# Patient Record
Sex: FEMALE | Race: WHITE
[De-identification: names, ages, dates, MRNs, and addresses within clinical notes are randomized per-mention and may not be internally consistent; named-entity substitution may affect disease eponyms.]

---

## 2017-12-19 ENCOUNTER — HOSPITAL ENCOUNTER (EMERGENCY)
Dept: HOSPITAL 56 - MW.ED | Age: 42
LOS: 1 days | Discharge: HOME | End: 2017-12-20
Payer: COMMERCIAL

## 2017-12-19 DIAGNOSIS — R07.89: ICD-10-CM

## 2017-12-19 DIAGNOSIS — F41.9: Primary | ICD-10-CM

## 2017-12-19 DIAGNOSIS — Z98.84: ICD-10-CM

## 2017-12-19 DIAGNOSIS — Z91.14: ICD-10-CM

## 2017-12-19 DIAGNOSIS — I10: ICD-10-CM

## 2017-12-19 DIAGNOSIS — Z88.5: ICD-10-CM

## 2017-12-19 LAB
CHLORIDE SERPL-SCNC: 105 MMOL/L (ref 98–110)
SODIUM SERPL-SCNC: 136 MMOL/L (ref 136–146)

## 2017-12-19 PROCEDURE — 96361 HYDRATE IV INFUSION ADD-ON: CPT

## 2017-12-19 PROCEDURE — 96374 THER/PROPH/DIAG INJ IV PUSH: CPT

## 2017-12-19 PROCEDURE — 93005 ELECTROCARDIOGRAM TRACING: CPT

## 2017-12-19 PROCEDURE — 84484 ASSAY OF TROPONIN QUANT: CPT

## 2017-12-19 PROCEDURE — 99285 EMERGENCY DEPT VISIT HI MDM: CPT

## 2017-12-19 PROCEDURE — 96375 TX/PRO/DX INJ NEW DRUG ADDON: CPT

## 2017-12-19 PROCEDURE — 71010: CPT

## 2017-12-19 PROCEDURE — 80053 COMPREHEN METABOLIC PANEL: CPT

## 2017-12-19 PROCEDURE — 87086 URINE CULTURE/COLONY COUNT: CPT

## 2017-12-19 PROCEDURE — 84443 ASSAY THYROID STIM HORMONE: CPT

## 2017-12-19 PROCEDURE — 85025 COMPLETE CBC W/AUTO DIFF WBC: CPT

## 2017-12-19 RX ADMIN — METOROPROLOL TARTRATE SCH MG: 5 INJECTION, SOLUTION INTRAVENOUS at 23:02

## 2017-12-19 RX ADMIN — METOROPROLOL TARTRATE SCH MG: 5 INJECTION, SOLUTION INTRAVENOUS at 23:18

## 2017-12-19 RX ADMIN — METOROPROLOL TARTRATE SCH MG: 5 INJECTION, SOLUTION INTRAVENOUS at 22:54

## 2017-12-19 RX ADMIN — METOROPROLOL TARTRATE SCH: 5 INJECTION, SOLUTION INTRAVENOUS at 23:21

## 2017-12-19 RX ADMIN — METOROPROLOL TARTRATE SCH: 5 INJECTION, SOLUTION INTRAVENOUS at 23:22

## 2017-12-19 NOTE — EDM.PDOC
<Brant Caballero - Last Filed: 12/19/17 23:57>





ED HPI GENERAL MEDICAL PROBLEM





- General


Stated Complaint: CHEST PAIN


Time Seen by Provider: 12/19/17 21:25





- History of Present Illness


INITIAL COMMENTS - FREE TEXT/NARRATIVE: 





Patient is seen and examined agree with above





HEENT grossly within normal limits


Chest clear throughout


CV regular rate and rhythm


Abdomen soft nontender benign exam


Extremities full range of motion strength 5 out of 5 no edema


CNS alert nonfocal





Lab as below


EKG


Chest 1 view





Assessment


Anxiety


Chest pain resolved


Hypertension-improved


Medication noncompliance


Headache resolved


Vasotec 1.25 mg IV


Plan





Lopressor 5 mg IV 3


Toradol 30 mg IV





Patient discharged with hydrochlorothiazide 25 mg by mouth daily #30 no refill


Ativan 0.5 mg by mouth twice a day #10 no refill





Definitive disposition and diagnosis as appropriate pending reevaluation and 

review of above





- Related Data


 Allergies











Allergy/AdvReac Type Severity Reaction Status Date / Time


 


morphine Allergy  Anaphylactic Verified 12/19/17 21:26





   Shock  











Home Meds: 


 Home Meds





. [No Known Home Meds]  12/19/17 [History]











Course





- Vital Signs


Last Recorded V/S: 


 Last Vital Signs











Temp  98.2 F   12/20/17 00:14


 


Pulse  74   12/20/17 00:14


 


Resp  18   12/20/17 00:14


 


BP  158/86 H  12/20/17 00:14


 


Pulse Ox  97   12/20/17 00:14














- Orders/Labs/Meds


Orders: 


 Active Orders 24 hr











 Category Date Time Status


 


 EKG Documentation Completion [RC] STAT Care  12/19/17 21:17 Active


 


 CULTURE URINE [RM] Stat Lab  12/19/17 22:51 Received











Labs: 


 Laboratory Tests











  12/19/17 12/19/17 12/19/17 Range/Units





  21:27 21:27 21:27 


 


WBC  11.19 H    (4.0-11.0)  K/uL


 


RBC  4.63    (4.30-5.90)  M/uL


 


Hgb  8.1 L    (12.0-16.0)  g/dL


 


Hct  27.8 L    (36.0-46.0)  %


 


MCV  60.0 L    (80.0-98.0)  fL


 


MCH  17.5 L    (27.0-32.0)  pg


 


MCHC  29.1 L    (31.0-37.0)  g/dL


 


RDW Std Deviation  40.3    (28.0-62.0)  fl


 


RDW Coeff of Gonsalo  19 H    (11.0-15.0)  %


 


Plt Count  216    (150-400)  K/uL


 


Add Manual Diff  YES    


 


Neutrophils % (Manual)  60    (48.0-80.0)  %


 


Band Neutrophils %  3    %


 


Lymphocytes % (Manual)  35    (16.0-40.0)  %


 


Monocytes % (Manual)  2    (0.0-15.0)  %


 


Nucleated RBC %  0.0    /100WBC


 


Absolute Seg Neuts  6.7 H    (1.4-5.7)  


 


Band Neutrophils #  0.3    


 


Lymphocytes # (Manual)  3.9 H    (0.6-2.4)  


 


Monocytes # (Manual)  0.2    (0.0-0.8)  


 


Nucleated RBCs #  0    K/uL


 


Sodium   136   (136-146)  mmol/L


 


Potassium   3.9   (3.5-5.1)  mmol/L


 


Chloride   105   ()  mmol/L


 


Carbon Dioxide   22   (21-31)  mmol/L


 


BUN   9   (6.0-23.0)  mg/dL


 


Creatinine   0.7   (0.6-1.5)  mg/dL


 


Est Cr Clr Drug Dosing   90.41   mL/min


 


Estimated GFR (MDRD)   > 60.0   ml/min


 


Glucose   87   ()  mg/dL


 


Calcium   9.3   (8.8-10.8)  mg/dL


 


Total Bilirubin   0.2   (0.1-1.5)  mg/dL


 


AST   17   (5-40)  IU/L


 


ALT   9   (8-54)  IU/L


 


Alkaline Phosphatase   135   ()  


 


Troponin I   < 0.10   (0.0-0.29)  NG/ML


 


Total Protein   8.0   (6.0-8.0)  g/dL


 


Albumin   3.9   (3.5-5.0)  g/dL


 


Globulin   4.1 H   (2.0-3.5)  g/dL


 


Albumin/Globulin Ratio   1.0 L   (1.3-2.8)  


 


TSH 3rd Generation    1.13  (0.47-5.0)  uIU/mL











Meds: 


Medications














Discontinued Medications














Generic Name Dose Route Start Last Admin





  Trade Name Freq  PRN Reason Stop Dose Admin


 


Aspirin  324 mg  12/19/17 21:17  12/19/17 21:41





  Aspirin  PO  12/19/17 21:18  324 mg





  ONETIME ONE   Administration


 


Enalaprilat  1.25 mg  12/19/17 23:15  12/19/17 23:32





  Vasotec Iv  IVPUSH  12/19/17 23:16  1.25 mg





  ONETIME ONE   Administration


 


Sodium Chloride  1,000 mls @ 999 mls/hr  12/19/17 21:30  12/19/17 21:48





  Normal Saline  IV  12/19/17 22:30  999 mls/hr





  STAT ONE   Administration


 


Ketorolac Tromethamine  30 mg  12/19/17 23:37  12/19/17 23:51





  Toradol  IVPUSH  12/19/17 23:38  30 mg





  ONETIME ONE   Administration


 


Lorazepam  1 mg  12/19/17 21:34  12/19/17 21:42





  Ativan  IVPUSH  12/19/17 21:35  1 mg





  ONETIME ONE   Administration


 


Metoprolol Tartrate  5 mg  12/19/17 22:15  12/19/17 23:18





  Lopressor  IVPUSH  12/19/17 22:26  5 mg





  Q5M THOR   Administration


 


Metoprolol Tartrate  5 mg  12/19/17 23:00  12/19/17 23:22





  Lopressor  IVPUSH  12/19/17 23:11  Not Given





  Q5M THOR   


 


Nitroglycerin  0.4 mg  12/19/17 21:30  





  Nitrostat  SL   





  Q5M PRN   





  Chest Pain   














Departure





- Departure


Time of Disposition: 00:01


Disposition: Home, Self-Care 01


Condition: Good


Clinical Impression: 


 Anxiety, Hypertension





Instructions:  Panic Attacks, Easy-to-Read, Hypertension


Referrals: 


PCP,None [Primary Care Provider] - 


Forms:  ED Department Discharge


Additional Instructions: 


The following information is given to patients seen in the emergency department 

who are being discharged to home. This information is to outline your options 

for follow-up care. We provide all patients seen in our emergency department 

with a follow-up referral.





The need for follow-up, as well as the timing and circumstances, are variable 

depending upon the specifics of your emergency department visit.





If you don't have a primary care physician on staff, we will provide you with a 

referral. We always advise you to contact your personal physician following an 

emergency department visit to inform them of the circumstance of the visit and 

for follow-up with them and/or the need for any referrals to a consulting 

specialist.





The emergency department will also refer you to a specialist when appropriate. 

This referral assures that you have the opportunity for follow-up care with a 

specialist. All of these measure are taken in an effort to provide you with 

optimal care, which includes your follow-up.





Under all circumstances we always encourage you to contact your private 

physician who remains a resource for coordinating your care. When calling for 

follow-up care, please make the office aware that this follow-up is from your 

recent emergency room visit. If for any reason you are refused follow-up, 

please contact the [facility] emergency department at [phone] 

and asked to speak to the emergency department charge nurse.








- My Orders


Last 24 Hours: 


My Active Orders





12/19/17 21:17


EKG Documentation Completion [RC] STAT 














- Assessment/Plan


Last 24 Hours: 


My Active Orders





12/19/17 21:17


EKG Documentation Completion [RC] STAT 














<Lucas Au E - Last Filed: 12/20/17 10:29>





ED HPI GENERAL MEDICAL PROBLEM





- General


Source of Information: Reports: Patient


History Limitations: Reports: No Limitations





- History of Present Illness


INITIAL COMMENTS - FREE TEXT/NARRATIVE: 





HISTORY AND PHYSICAL:





History of present illness:


Patient is a 42-year-old female who presents to the emergency room today with 

complaints of chest pain. She states that last night she started to have some 

neck pain that radiated to her head which created a "little anxiety". That 

night she experienced mild chest pain that would last only a few seconds. She 

states that she had not really given much thought today until about an hour 

prior to arrival she started experiencing midsternal chest pain, shortness of 

breath, nausea and anxiety. She is concerned she is having a "heart attack". 

Current pain is a 8/10 chest she describes as "something sitting on my chest".





Has a past medical history of hypertension. She states she does not like to 

take prescribed medication because "the FDA is trying to kill all of us". She 

takes a over-the-counter supplement called natural remedies which she feels 

helps her blood pressure. Also had a gastric bypass in 2009.





Family history of diabetes, cancer and heart disease. She states that both of 

her parents have passed away from heart attacks.


She has no smoking/tobacco use history.





Review of systems: 


As per history of present illness and below otherwise all systems reviewed and 

negative.





Past medical history: 


As per history of present illness and as reviewed below otherwise 

noncontributory.





Surgical history: 


As per history of present illness and as reviewed below otherwise 

noncontributory.





Social history: 


No reported history of drug or alcohol abuse.





Family history: 


As per history of present illness and as reviewed below otherwise 

noncontributory.





Physical exam:


Gen.: Well-developed and well-nourished 42-year-old female. Alert and oriented. 

Appears anxious but in no acute distress. Nontoxic appearing.


HEENT: Atraumatic, normocephalic, pupils reactive, negative for conjunctival 

pallor or scleral icterus, mucous membranes moist, throat clear, neck supple, 

nontender, trachea midline.


Lungs: Clear to auscultation, breath sounds equal bilaterally, chest slightly 

tender to the left chest wall and pain is reproducible.


Heart: S1S2, regular rate and rhythm without overt murmurs


Abdomen: Soft, nondistended, nontender. Negative for masses or 

hepatosplenomegaly. Negative for costovertebral tenderness.


Pelvis: Stable nontender.


Genitourinary: Deferred.


Rectal: Deferred.


Extremities: Atraumatic, negative for cords or calf pain. Neurovascular 

unremarkable.


Neuro: Awake, alert, oriented. Cranial nerves II through XII unremarkable. 

Cerebellum unremarkable. Motor and sensory unremarkable throughout. Exam 

nonfocal.





Patient was signed out to Dr. Martinez at 2200.





Diagnostics:


CBC, CMP, troponin, EKG, one view chest, TSH





Therapeutics:


Cardiac monitor, aspirin, nitroglycerin, fluids





Impression: 


Chest pain


Anxiety





Plan:


Dr. Kiran discharged this patient to home. Please see his note


Definitive disposition and diagnosis as appropriate pending reevaluation and 

review of above.





Onset: Today


Duration: Hour(s):


Location: Reports: Chest


Quality: Reports: Pressure


Severity: Severe


Improves with: Reports: None


Worsens with: Reports: None


Associated Symptoms: Reports: Chest Pain, Headaches (Has resolved since last 

night), Nausea/Vomiting, Shortness of Breath.  Denies: Confusion, Cough, cough 

w sputum, Diaphoresis, Fever/Chills, Loss of Appetite, Malaise, Rash, Seizure, 

Syncope, Weakness


  ** chest pain


Pain Score (Numeric/FACES): 6





ED ROS GENERAL





- Review of Systems


Review Of Systems: ROS reveals no pertinent complaints other than HPI.





ED EXAM, GENERAL





- Physical Exam


Exam: See Below (See dictation)





Course





- Vital Signs


Last Recorded V/S: 


 Last Vital Signs











Temp  98.2 F   12/20/17 00:14


 


Pulse  74   12/20/17 00:14


 


Resp  18   12/20/17 00:14


 


BP  158/86 H  12/20/17 00:14


 


Pulse Ox  97   12/20/17 00:14














- Orders/Labs/Meds


Labs: 


 Laboratory Tests











  12/19/17 12/19/17 12/19/17 Range/Units





  21:27 21:27 21:27 


 


WBC  11.19 H    (4.0-11.0)  K/uL


 


RBC  4.63    (4.30-5.90)  M/uL


 


Hgb  8.1 L    (12.0-16.0)  g/dL


 


Hct  27.8 L    (36.0-46.0)  %


 


MCV  60.0 L    (80.0-98.0)  fL


 


MCH  17.5 L    (27.0-32.0)  pg


 


MCHC  29.1 L    (31.0-37.0)  g/dL


 


RDW Std Deviation  40.3    (28.0-62.0)  fl


 


RDW Coeff of Gonsalo  19 H    (11.0-15.0)  %


 


Plt Count  216    (150-400)  K/uL


 


Add Manual Diff  YES    


 


Neutrophils % (Manual)  60    (48.0-80.0)  %


 


Band Neutrophils %  3    %


 


Lymphocytes % (Manual)  35    (16.0-40.0)  %


 


Monocytes % (Manual)  2    (0.0-15.0)  %


 


Nucleated RBC %  0.0    /100WBC


 


Absolute Seg Neuts  6.7 H    (1.4-5.7)  


 


Band Neutrophils #  0.3    


 


Lymphocytes # (Manual)  3.9 H    (0.6-2.4)  


 


Monocytes # (Manual)  0.2    (0.0-0.8)  


 


Nucleated RBCs #  0    K/uL


 


Sodium   136   (136-146)  mmol/L


 


Potassium   3.9   (3.5-5.1)  mmol/L


 


Chloride   105   ()  mmol/L


 


Carbon Dioxide   22   (21-31)  mmol/L


 


BUN   9   (6.0-23.0)  mg/dL


 


Creatinine   0.7   (0.6-1.5)  mg/dL


 


Est Cr Clr Drug Dosing   90.41   mL/min


 


Estimated GFR (MDRD)   > 60.0   ml/min


 


Glucose   87   ()  mg/dL


 


Calcium   9.3   (8.8-10.8)  mg/dL


 


Total Bilirubin   0.2   (0.1-1.5)  mg/dL


 


AST   17   (5-40)  IU/L


 


ALT   9   (8-54)  IU/L


 


Alkaline Phosphatase   135   ()  


 


Troponin I   < 0.10   (0.0-0.29)  NG/ML


 


Total Protein   8.0   (6.0-8.0)  g/dL


 


Albumin   3.9   (3.5-5.0)  g/dL


 


Globulin   4.1 H   (2.0-3.5)  g/dL


 


Albumin/Globulin Ratio   1.0 L   (1.3-2.8)  


 


TSH 3rd Generation    1.13  (0.47-5.0)  uIU/mL

## 2017-12-20 NOTE — CR
EXAM DATE: 17



PATIENT'S AGE: 42





Patient: GROVER GARCIA



Facility: [place], [place]

Patient ID: [id number]

Site Patient ID: [id number].

Site Accession #: [id number]

: 1975

Study: XRay Chest [id number]/19/2017 10:19:59 PM

Ordering Physician: Qiana Guo



Final Report: 

Indication:

Chest pain



Technique:

Chest 1 view



Comparison:

None



Findings/Impression:

Cardiovascular and mediastinum: Unremarkable cardiac silhouette for a portable 
technique. An unfolded aorta. 

Lungs and pleural space: A lordotic study. Elevated right hemidiaphragm. No 
consolidation or pleural effusions. No pneumothorax seen. 

Bones and soft tissues: No significant findings.



Dictated by Beny Shah MD @ 2017 11:14:35 PM





Dictated by: Beny Shah MD @ 2017 23:14:45

(Electronic Signature)



Report Signed by Proxy.
[facility]

## 2018-02-16 ENCOUNTER — HOSPITAL ENCOUNTER (EMERGENCY)
Dept: HOSPITAL 56 - MW.ED | Age: 43
Discharge: HOME | End: 2018-02-16
Payer: COMMERCIAL

## 2018-02-16 DIAGNOSIS — Z91.040: ICD-10-CM

## 2018-02-16 DIAGNOSIS — G44.209: Primary | ICD-10-CM

## 2018-02-16 DIAGNOSIS — Z91.048: ICD-10-CM

## 2018-02-16 DIAGNOSIS — Z88.8: ICD-10-CM

## 2018-02-16 DIAGNOSIS — I10: ICD-10-CM

## 2018-02-16 DIAGNOSIS — Z88.5: ICD-10-CM

## 2018-02-16 PROCEDURE — 96372 THER/PROPH/DIAG INJ SC/IM: CPT

## 2018-02-16 PROCEDURE — 99284 EMERGENCY DEPT VISIT MOD MDM: CPT

## 2018-02-16 NOTE — EDM.PDOC
ED HPI GENERAL MEDICAL PROBLEM





- General


Chief Complaint: Headache


Stated Complaint: BLOOD PRESSURE MEDS/HEADACHE


Time Seen by Provider: 02/16/18 19:10


Source of Information: Reports: Patient


History Limitations: Reports: No Limitations





- History of Present Illness


INITIAL COMMENTS - FREE TEXT/NARRATIVE: 


HISTORY AND PHYSICAL:





History of present illness:


Patient is a 42-year-old female who presents to the emergency room today with 

complaints of an intermittent headache  2 weeks. She says in the last 2 weeks 

she has stopped her caffeine use and has been out of her antihypertensive 

medication. Previously she was taking losartan 100 mg daily, but recently moved 

here and has not been able to find a primary care provider. She reports that 

she has  insurance and had some difficulty finding someone "out of 

network".


Describes the headache as a tension headache with pressure around her scalp and 

going into her trapezius muscles, light sensitivity and noise sensitivity. She 

denies any chest pain, shortness of breath, abdominal pain, nausea, vomiting or 

diarrhea. She denies any recent head injury or trauma. No change in vision.





Patient reports that she was seen in December 2017 and prescribed "something 

for my blood pressure" but was on sure of what was called. She did not take 

this medication for very long "it was so weak and didn't do anything for my 

blood pressure" as she was able to fill her Losartan that time. She has not 

followed-up since that visit.





Review of systems: 


As per history of present illness and below otherwise all systems reviewed and 

negative.





Past medical history: 


As per history of present illness and as reviewed below otherwise 

noncontributory.





Surgical history: 


As per history of present illness and as reviewed below otherwise 

noncontributory.





Social history: 


No reported history of drug or alcohol abuse.





Family history: 


As per history of present illness and as reviewed below otherwise 

noncontributory.





Physical exam:


General: Well-developed and well-nourished 42-year-old female. Alert and 

oriented. Nontoxic appearing and in no acute distress.


HEENT: Atraumatic, normocephalic, pupils reactive, negative for conjunctival 

pallor or scleral icterus, mucous membranes moist, throat clear, neck supple, 

nontender, trachea midline. No drooling or trismus. No meningeal signs.


Lungs: Clear to auscultation, breath sounds equal bilaterally, chest nontender.


Heart: S1S2, regular rate and rhythm


Abdomen: Soft, nondistended, nontender. Negative for masses or 

hepatosplenomegaly. Negative for costovertebral tenderness.


Pelvis: Stable nontender.


Genitourinary: Deferred.


Rectal: Deferred.


Extremities: Atraumatic, negative for cords or calf pain. Neurovascular 

unremarkable.


Neuro: Awake, alert, oriented. Cranial nerves II through XII unremarkable. 

Cerebellum unremarkable. Motor and sensory unremarkable throughout. Exam 

nonfocal.





After physical examination I did offer the patient routine lab work along with 

IV medications to help alleviate her migraine headache. She declines any lab 

work at this time. She would like to try to manage her blood pressure prior to 

any IV medications. She states that "once I get my blood pressure lowered I 

think the headache will go away". I am willing to try Clonidine by mouth 

initially, if this does not lower her blood pressure and alleviate her headache 

at that time she is willing to do IV medications.





Patient's blood pressure has improved. Patient continues to have a headache. As 

the patient is describing a tension type headache for the past 2 weeks I will 

give her one tablet of Flexeril. She currently does not have a ride, will 

dispense to home. Thorough education was given to her. She'll take this tab 

prior to bedtime. She declined the lab work that we discussed previously. 

Declines any IV medications. She is requesting to go home. For further 

management she will take her prescribed losartan and over-the-counter pain 

medication to manage her headache. Information and referral to primary care was 

given to her. She voices understanding of following up for further education 

refills. She denies any further questions at this time.





Diagnostics:


[]





Therapeutics:


Clonidine 0.1 mg PO





Impression: 


#1 request for medication refill


#2 hypertension


#3 tension headache





Plan:


1. Please take the tablet of Flexeril prior to bedtime. This medication will 

cause drowsiness or do not take it when driving or needing to be functioning 

outside of the house.


2. A limited amount of your low certain has been prescribed for you. For 

further medication refills you do need to find primary care. Phone numbers have 

been given to you. If he told them he had been seen in the emergency room he 

can get an expedited appointment. Please call Monday to arrange that.


3. Return to the ED as needed and as discussed.





Definitive disposition and diagnosis as appropriate pending reevaluation and 

review of above.





Duration: Week(s):


Location: Reports: Head


  ** headache


Pain Score (Numeric/FACES): 7





- Related Data


 Allergies











Allergy/AdvReac Type Severity Reaction Status Date / Time


 


adhesive Allergy  Hives Verified 02/16/18 19:24


 


latex Allergy  Hives Verified 02/16/18 19:24


 


morphine Allergy  Anaphylactic Verified 02/16/18 19:23





   Shock  


 


povidone-iodine Allergy  Hives Verified 02/16/18 19:24





[From Betadine]     


 


soap [From Betadine] Allergy  Hives Verified 02/16/18 19:24











Home Meds: 


 Home Meds





Antihypertensive Medication  02/16/18 [History]











Past Medical History


HEENT History: Reports: None


Cardiovascular History: Reports: Hypertension


Respiratory History: Reports: None


Gastrointestinal History: Reports: None


Genitourinary History: Reports: None


Musculoskeletal History: Reports: None


Neurological History: Reports: None


Psychiatric History: Reports: Anxiety


Endocrine/Metabolic History: Reports: None


Hematologic History: Reports: None


Immunologic History: Reports: None


Oncologic (Cancer) History: Reports: None


Dermatologic History: Reports: None





- Infectious Disease History


Infectious Disease History: Reports: None





- Past Surgical History


Head Surgeries/Procedures: Reports: None


GI Surgical History: Reports: Bariatric Procedure


Female  Surgical History: Reports: None





Social & Family History





- Family History


Family Medical History: Noncontributory


Cardiac: Reports: Other (See Below)


Other Cardiac Family History: Heart Disease


Endocrine/Metabolic: Reports: Diabetes, type II


Oncologic: Reports: Other (See Below)


Other Oncologic Family History: Cancer





- Tobacco Use


Smoking Status *Q: Never Smoker





- Caffeine Use


Caffeine Use: Reports: None





- Recreational Drug Use


Recreational Drug Use: No





ED ROS GENERAL





- Review of Systems


Review Of Systems: ROS reveals no pertinent complaints other than HPI.





- Physical Exam


Exam: See Below (See dictation)





Course





- Vital Signs


Last Recorded V/S: 


 Last Vital Signs











Temp  98.1 F   02/16/18 20:22


 


Pulse  77   02/16/18 20:22


 


Resp  17   02/16/18 20:22


 


BP  142/81 H  02/16/18 20:22


 


Pulse Ox  99   02/16/18 20:22














- Orders/Labs/Meds


Orders: 


 Active Orders 24 hr











 Category Date Time Status


 


 Cyclobenzaprine [Flexeril] Med  02/16/18 20:24 Once





 10 mg PO ONETIME ONE   











Meds: 


Medications














Discontinued Medications














Generic Name Dose Route Start Last Admin





  Trade Name Carlos  PRN Reason Stop Dose Admin


 


Clonidine HCl  0.1 mg  02/16/18 19:35  02/16/18 19:41





  Catapres  PO  02/16/18 19:36  0.1 mg





  ONETIME ONE   Administration


 


Ketorolac Tromethamine  60 mg  02/16/18 19:42  02/16/18 19:49





  Toradol  IM  02/16/18 19:43  60 mg





  ONETIME ONE   Administration














Departure





- Departure


Time of Disposition: 20:27


Disposition: Home, Self-Care 01


Clinical Impression: 


 Encounter for medication refill, Tension-type headache





Hypertension


Qualifiers:


 Hypertension type: essential hypertension Qualified Code(s): I10 - Essential (

primary) hypertension








- Discharge Information


Referrals: 


PCP,None [Primary Care Provider] - 


Forms:  ED Department Discharge


Additional Instructions: 


My general discharge


The following information is given to patients seen in the emergency department 

who are being discharged to home. This information is to outline your options 

for follow-up care. We provide all patients seen in our emergency department 

with a follow-up referral.





The need for follow-up, as well as the timing and circumstances, are variable 

depending upon the specifics of your emergency department visit.





If you don't have a primary care physician on staff, we will provide you with a 

referral. We always advise you to contact your personal physician following an 

emergency department visit to inform them of the circumstance of the visit and 

for follow-up with them and/or the need for any referrals to a consulting 

specialist.





The emergency department will also refer you to a specialist when appropriate. 

This referral assures that you have the opportunity for follow-up care with a 

specialist. All of these measure are taken in an effort to provide you with 

optimal care, which includes your follow-up.





Under all circumstances we always encourage you to contact your private 

physician who remains a resource for coordinating your care. When calling for 

follow-up care, please make the office aware that this follow-up is from your 

recent emergency room visit. If for any reason you are refused follow-up, 

please contact the [facility] Emergency 

Department at [phone] and asked to speak to the emergency department 

charge nurse.





[facility]


Primary Care


[address] [place] [id number]


Phone: [phone]


Fax: [phone]





1. Please take the tablet of Flexeril prior to bedtime. This medication (muscle 

relaxor for the tension headache) will cause drowsiness or do not take it when 

driving or needing to be functioning outside of the house.


2. A limited amount of your Losartan (blood pressure medication) has been 

prescribed for you. For further medication refills you do need to find primary 

care. Phone numbers have been given to you. If you inform the appointment desk 

that you been seen in the emergency room, they typically can get you an 

expedited appointment. Please call Monday to arrange that.


3. Return to the ED as needed and as discussed.





- My Orders


Last 24 Hours: 


My Active Orders





02/16/18 20:24


Cyclobenzaprine [Flexeril]   10 mg PO ONETIME ONE 














- Assessment/Plan


Last 24 Hours: 


My Active Orders





02/16/18 20:24


Cyclobenzaprine [Flexeril]   10 mg PO ONETIME ONE

## 2018-04-20 ENCOUNTER — HOSPITAL ENCOUNTER (INPATIENT)
Dept: HOSPITAL 56 - MW.ED | Age: 43
LOS: 1 days | Discharge: HOME | DRG: 812 | End: 2018-04-21
Attending: INTERNAL MEDICINE | Admitting: INTERNAL MEDICINE
Payer: COMMERCIAL

## 2018-04-20 DIAGNOSIS — R10.11: ICD-10-CM

## 2018-04-20 DIAGNOSIS — K29.70: ICD-10-CM

## 2018-04-20 DIAGNOSIS — Z98.84: ICD-10-CM

## 2018-04-20 DIAGNOSIS — Z91.040: ICD-10-CM

## 2018-04-20 DIAGNOSIS — N13.30: ICD-10-CM

## 2018-04-20 DIAGNOSIS — Z87.442: ICD-10-CM

## 2018-04-20 DIAGNOSIS — R19.7: ICD-10-CM

## 2018-04-20 DIAGNOSIS — F41.9: ICD-10-CM

## 2018-04-20 DIAGNOSIS — D25.9: ICD-10-CM

## 2018-04-20 DIAGNOSIS — D64.9: Primary | ICD-10-CM

## 2018-04-20 DIAGNOSIS — B96.81: ICD-10-CM

## 2018-04-20 DIAGNOSIS — N13.4: ICD-10-CM

## 2018-04-20 DIAGNOSIS — R00.2: ICD-10-CM

## 2018-04-20 DIAGNOSIS — D62: ICD-10-CM

## 2018-04-20 DIAGNOSIS — I10: ICD-10-CM

## 2018-04-20 DIAGNOSIS — Z79.899: ICD-10-CM

## 2018-04-20 DIAGNOSIS — Z88.5: ICD-10-CM

## 2018-04-20 DIAGNOSIS — N39.0: ICD-10-CM

## 2018-04-20 DIAGNOSIS — N92.0: ICD-10-CM

## 2018-04-20 PROCEDURE — 88104 CYTOPATH FL NONGYN SMEARS: CPT

## 2018-04-20 PROCEDURE — 86921 COMPATIBILITY TEST INCUBATE: CPT

## 2018-04-20 PROCEDURE — 83735 ASSAY OF MAGNESIUM: CPT

## 2018-04-20 PROCEDURE — 36415 COLL VENOUS BLD VENIPUNCTURE: CPT

## 2018-04-20 PROCEDURE — 86677 HELICOBACTER PYLORI ANTIBODY: CPT

## 2018-04-20 PROCEDURE — P9016 RBC LEUKOCYTES REDUCED: HCPCS

## 2018-04-20 PROCEDURE — 83550 IRON BINDING TEST: CPT

## 2018-04-20 PROCEDURE — 86901 BLOOD TYPING SEROLOGIC RH(D): CPT

## 2018-04-20 PROCEDURE — 86922 COMPATIBILITY TEST ANTIGLOB: CPT

## 2018-04-20 PROCEDURE — 82607 VITAMIN B-12: CPT

## 2018-04-20 PROCEDURE — 30233N1 TRANSFUSION OF NONAUTOLOGOUS RED BLOOD CELLS INTO PERIPHERAL VEIN, PERCUTANEOUS APPROACH: ICD-10-PCS | Performed by: INTERNAL MEDICINE

## 2018-04-20 PROCEDURE — 86920 COMPATIBILITY TEST SPIN: CPT

## 2018-04-20 PROCEDURE — 84100 ASSAY OF PHOSPHORUS: CPT

## 2018-04-20 PROCEDURE — 82272 OCCULT BLD FECES 1-3 TESTS: CPT

## 2018-04-20 PROCEDURE — 96374 THER/PROPH/DIAG INJ IV PUSH: CPT

## 2018-04-20 PROCEDURE — 86900 BLOOD TYPING SEROLOGIC ABO: CPT

## 2018-04-20 PROCEDURE — 96361 HYDRATE IV INFUSION ADD-ON: CPT

## 2018-04-20 PROCEDURE — 86850 RBC ANTIBODY SCREEN: CPT

## 2018-04-20 PROCEDURE — 99285 EMERGENCY DEPT VISIT HI MDM: CPT

## 2018-04-20 PROCEDURE — 96375 TX/PRO/DX INJ NEW DRUG ADDON: CPT

## 2018-04-20 PROCEDURE — C9113 INJ PANTOPRAZOLE SODIUM, VIA: HCPCS

## 2018-04-20 PROCEDURE — 87086 URINE CULTURE/COLONY COUNT: CPT

## 2018-04-20 NOTE — EDM.PDOC
ED HPI GENERAL MEDICAL PROBLEM





- General


Chief Complaint: Abdominal Pain


Stated Complaint: RIGHT UPPER ABDOMINAL PAIN


Time Seen by Provider: 04/20/18 15:36


Source of Information: Reports: Patient





- History of Present Illness


INITIAL COMMENTS - FREE TEXT/NARRATIVE: 





HISTORY AND PHYSICAL:


History of present illness:


[Patient with history of gastric bypass presents with right upper quadrant pain 

intermittently over weeks to months worsening today maximally 10 out of 10 

currently 2 out of 10 no fever nausea vomiting chills sweats





Previous examination there is clinic's significant for a negative ultrasound 

and normal lab included been liver function and pancreatic function hemoglobin 

found to be 7.5





No fever nausea vomiting chills sweats no chest pain shortness breath headache 

dizziness palpitation no bowel or urine symptoms


]


Review of systems: 


As per history of present illness and below otherwise all systems reviewed and 

negative.


Past medical history: 


As per history of present illness and as reviewed below otherwise 

noncontributory.


Surgical history: 


As per history of present illness and as reviewed below otherwise 

noncontributory.


Social history: 


No reported history of drug or alcohol abuse.


Family history: 


As per history of present illness and as reviewed below otherwise 

noncontributory.


Physical exam:


HEENT: Atraumatic, normocephalic, pupils reactivepositive conjunctival pallor 

negative for scleral icterus, mucous membranes moist, throat clear, neck supple

, nontender, trachea midline.


Lungs: Clear to auscultation, breath sounds equal bilaterally, chest nontender.


Heart: S1S2, regular, negative for clicks, rubs, or JVD.


Abdomen: Soft, nondistended, nontender. Negative for masses or 

hepatosplenomegaly. Negative for costovertebral tenderness.


Pelvis: Stable nontender.


Genitourinary: Deferred.


Rectal: Deferred.


Extremities: Atraumatic, negative for cords or calf pain. Neurovascular 

unremarkable.


Neuro: Awake, alert, oriented. Cranial nerves II through XII unremarkable. 

Cerebellum unremarkable. Motor and sensory unremarkable throughout. Exam 

nonfocal.





Diagnostics:


[CBC CMP lipase on file from previous clinic visit


Ultrasound right upper quadrant on file from clinic visit today


Guaiac-negative here in the emergency room


Type and screen


]


Therapeutics:


[2 units packed red blood cells


 normal saline bolus


Dilaudid 1 mg IV


Proton X 80 mg IV


]


Impression: 


[ anemia


Biliary colic


Chronic history of baseline ]


Definitive disposition and diagnosis as appropriate pending reevaluation and 

review of above.


  ** RUQ Pain


Pain Score (Numeric/FACES): 7





- Related Data


 Allergies











Allergy/AdvReac Type Severity Reaction Status Date / Time


 


adhesive Allergy  Hives Verified 04/20/18 14:46


 


latex Allergy  Hives Verified 04/20/18 14:46


 


morphine Allergy  Anaphylactic Verified 04/20/18 14:46





   Shock  


 


povidone-iodine Allergy  Hives Verified 04/20/18 14:46





[From Betadine]     


 


soap [From Betadine] Allergy  Hives Verified 04/20/18 14:46











Home Meds: 


 Home Meds





Losartan [Cozaar] 100 mg PO DAILY 04/20/18 [History]











Past Medical History


HEENT History: Reports: None


Cardiovascular History: Reports: Hypertension


Respiratory History: Reports: None


Gastrointestinal History: Reports: None


Genitourinary History: Reports: None


OB/GYN History: Reports: Pregnancy


Musculoskeletal History: Reports: None


Neurological History: Reports: None


Psychiatric History: Reports: Anxiety


Endocrine/Metabolic History: Reports: None


Hematologic History: Reports: None


Immunologic History: Reports: None


Oncologic (Cancer) History: Reports: None


Dermatologic History: Reports: None





- Infectious Disease History


Infectious Disease History: Reports: None





- Past Surgical History


Head Surgeries/Procedures: Reports: None


GI Surgical History: Reports: Bariatric Procedure


Female  Surgical History: Reports: None, Tubal Ligation





Social & Family History





- Family History


Family Medical History: Noncontributory


Cardiac: Reports: Other (See Below)


Other Cardiac Family History: Heart Disease


Endocrine/Metabolic: Reports: Diabetes, type II


Oncologic: Reports: Other (See Below)


Other Oncologic Family History: Cancer





- Tobacco Use


Smoking Status *Q: Never Smoker


Second Hand Smoke Exposure: No





- Caffeine Use


Caffeine Use: Reports: None





- Recreational Drug Use


Recreational Drug Use: No





ED ROS GENERAL





- Review of Systems


Review Of Systems: ROS reveals no pertinent complaints other than HPI.





ED EXAM, GENERAL





- Physical Exam


Exam: See Below





Course





- Vital Signs


Last Recorded V/S: 


 Last Vital Signs











Temp  98.9 F   04/20/18 14:42


 


Pulse  88   04/20/18 14:42


 


Resp  18   04/20/18 14:42


 


BP  137/74   04/20/18 14:42


 


Pulse Ox  99   04/20/18 14:42














- Orders/Labs/Meds


Orders: 


 Active Orders 24 hr











 Category Date Time Status


 


 Guaiac [OCCULT BLOOD DIAGNOSTIC] [OP] Stat Lab  04/20/18 15:36 Ordered


 


 PACKED CELLS [RED BLOOD CELLS LP] [BBK] Stat Lab  04/20/18 14:55 Received


 


 TYPE AND SCREEN [BBK] Stat Lab  04/20/18 14:55 Received


 


 Sodium Chloride 0.9% [Normal Saline] 1,000 ml Med  04/20/18 14:45 Active





 IV STAT   








 Medication Orders





Sodium Chloride (Normal Saline)  1,000 mls @ 125 mls/hr IV STAT THOR


   Last Admin: 04/20/18 15:09  Dose: 125 mls/hr








Meds: 


Medications











Generic Name Dose Route Start Last Admin





  Trade Name Freq  PRN Reason Stop Dose Admin


 


Sodium Chloride  1,000 mls @ 125 mls/hr  04/20/18 14:45  04/20/18 15:09





  Normal Saline  IV   125 mls/hr





  STAT THOR   Administration





     





     





     





     














Discontinued Medications














Generic Name Dose Route Start Last Admin





  Trade Name Freq  PRN Reason Stop Dose Admin


 


Hydromorphone HCl  1 mg  04/20/18 14:52  04/20/18 15:10





  Dilaudid  IVPUSH  04/20/18 14:53  1 mg





  ONETIME ONE   Administration





     





     





     





     


 


Pantoprazole Sodium  80 mg  04/20/18 14:58  04/20/18 15:10





  Protonix Iv***  IVPUSH  04/20/18 14:59  80 mg





  .BOLUS ONE   Administration





     





     





     





     














Departure





- Departure


Time of Disposition: 15:59


Disposition: Admitted As Inpatient 66


Condition: Poor


Clinical Impression: 


 Anemia








- Discharge Information


Referrals: 


Julio César Rodriguez MD [Primary Care Provider] - 


Forms:  ED Department Discharge





- My Orders


Last 24 Hours: 


My Active Orders





04/20/18 14:45


Sodium Chloride 0.9% [Normal Saline] 1,000 ml IV STAT 





04/20/18 14:55


PACKED CELLS [RED BLOOD CELLS LP] [BBK] Stat 


TYPE AND SCREEN [BBK] Stat 





04/20/18 15:36


Guaiac [OCCULT BLOOD DIAGNOSTIC] [OP] Stat 














- Assessment/Plan


Last 24 Hours: 


My Active Orders





04/20/18 14:45


Sodium Chloride 0.9% [Normal Saline] 1,000 ml IV STAT 





04/20/18 14:55


PACKED CELLS [RED BLOOD CELLS LP] [BBK] Stat 


TYPE AND SCREEN [BBK] Stat 





04/20/18 15:36


Guaiac [OCCULT BLOOD DIAGNOSTIC] [OP] Stat

## 2018-04-20 NOTE — PCM.HP
H&P History of Present Illness





- General


Date of Service: 04/20/18


Admit Problem/Dx: 


 Admission Diagnosis/Problem





Admission Diagnosis/Problem      Anemia, abdominal pain








Source of Information: Patient


History Limitations: Reports: No Limitations





- History of Present Illness


Initial Comments - Free Text/Narative: 


42-year-old female with past medical history of gastric bypass surgery and 

kidney stones, is being admitted with anemia and diffuse abdominal pain that 

can be localized at times to the right upper quadrant. This pain has waxed and 

waned over the past few weeks but has worsened over the past 3 days. There are 

no aggravating factors. Patient does have a history of bariatric surgery in 

2009. She is not currently following with the bariatric surgeon but has an 

appointment set up with one in [place] in the upcoming weeks. She has had 

episodes of diarrhea but denies any blood in the stool. Urinary habits are 

unchanged. She notes that her periods are very irregular and that when they are 

present that she goes through multiple pads in a 30 minute to an hour period 

she did have a transvaginal ultrasound back in 2015 which showed that she does 

have uterine fibroids. She has an upcoming appointment with an OB/GYN on April 30 to discuss a possible hysterectomy. Her last menstrual period was one week 

ago. She has also had tubal ligation and her ovaries removed. She states that 

the abdominal pain that she is feeling is very similar to when she had kidney 

stones in the past. Again, she denies any urinary symptoms. She has felt 

increased fatigue over the last few weeks as well. She also endorses some 

epigastric pain and some left left upper quadrant pain. She does have a history 

of reflux but does not currently take anything. She also has a history of 

hypertension. Patient recently had a right upper quadrant ultrasound which was 

negative. She is not taking any blood thinners or daily aspirin at this time.





Patient also notes a fluttering feeling of the heart without any chest pain 

since the worsening of her symptoms of abdominal pain over the last 3 days. 

These fluttering feelings last for 40 seconds and occur on a daily basis. This 

has no history of heart attack or stroke. There is a family history of heart 

disease with her father having suffered a heart attack in the past.





As the patient today she denies any headaches, dizziness, chest pain, 

palpitations, shortness of breath, wheezing, cough,, dysuria, hematuria, 

peripheral edema, numbness/tingling/weakness in the upper and lower extremities 

bilaterally, fever.





ER course:


Blood work was not done as the patient was seen previously in clinic on the 

same day today. She was given an IV dose of Dilaudid and also Protonix while in 

the ER. Blood work done in the clinic of her PCP showed an ESR of 41, 

hemoglobin of 7.5. Amylase and lipase were negative. CMP was unremarkable, 

Including liver function tests. UA showed +2 bacteria.


  ** RUQ Pain


Pain Score (Numeric/FACES): 7





- Related Data


Allergies/Adverse Reactions: 


 Allergies











Allergy/AdvReac Type Severity Reaction Status Date / Time


 


adhesive Allergy  Hives Verified 04/20/18 14:46


 


latex Allergy  Hives Verified 04/20/18 14:46


 


morphine Allergy  Anaphylactic Verified 04/20/18 14:46





   Shock  


 


povidone-iodine Allergy  Hives Verified 04/20/18 14:46





[From Betadine]     


 


soap [From Betadine] Allergy  Hives Verified 04/20/18 14:46











Home Medications: 


 Home Meds





Losartan [Cozaar] 100 mg PO DAILY 04/20/18 [History]











Past Medical History


HEENT History: Reports: None


Cardiovascular History: Reports: Hypertension


Respiratory History: Reports: None


Gastrointestinal History: Reports: None


Genitourinary History: Reports: None


OB/GYN History: Reports: Pregnancy


Musculoskeletal History: Reports: None


Neurological History: Reports: None


Psychiatric History: Reports: Anxiety


Endocrine/Metabolic History: Reports: None


Hematologic History: Reports: None


Immunologic History: Reports: None


Oncologic (Cancer) History: Reports: None


Dermatologic History: Reports: None





- Infectious Disease History


Infectious Disease History: Reports: None





- Past Surgical History


Head Surgeries/Procedures: Reports: None


GI Surgical History: Reports: Bariatric Procedure


Female  Surgical History: Reports: None, Tubal Ligation





Social & Family History





- Family History


Family Medical History: Noncontributory


Cardiac: Reports: Other (See Below)


Other Cardiac Family History: Heart Disease


Endocrine/Metabolic: Reports: Diabetes, type II


Oncologic: Reports: Other (See Below)


Other Oncologic Family History: Cancer





- Tobacco Use


Smoking Status *Q: Never Smoker


Second Hand Smoke Exposure: No





- Caffeine Use


Caffeine Use: Reports: None





- Recreational Drug Use


Recreational Drug Use: No





H&P Review of Systems





- Review of Systems:


Review Of Systems: See Below


General: Reports: Fatigue


HEENT: Reports: No Symptoms


Pulmonary: Reports: No Symptoms


Cardiovascular: Reports: Palpitations


Gastrointestinal: Reports: Abdominal Pain, Diarrhea, Nausea, Vomiting


Genitourinary: Reports: No Symptoms


Musculoskeletal: Reports: No Symptoms


Skin: Reports: No Symptoms


Psychiatric: Reports: No Symptoms


Neurological: Reports: No Symptoms


Hematologic/Lymphatic: Reports: No Symptoms


Immunologic: Reports: No Symptoms





Exam





- Exam


Exam: See Below





- Vital Signs


Vital Signs: 


 Last Vital Signs











Temp  98.8 F   04/20/18 16:49


 


Pulse  93   04/20/18 16:49


 


Resp  18   04/20/18 16:49


 


BP  141/84 H  04/20/18 16:49


 


Pulse Ox  98   04/20/18 16:49











Weight: 163 lb 2.273 oz





- Exam


General: Alert, Oriented, Cooperative


HEENT: Conjunctiva Clear, Hearing Intact, Mucosa Moist & Pink


Neck: Supple, Trachea Midline, 2


Lungs: Clear to Auscultation, Normal Respiratory Effort


Cardiovascular: Regular Rate, Regular Rhythm


GI/Abdominal Exam: Normal Bowel Sounds, Soft, No Organomegaly, No Distention, 

No Abnormal Bruit, No Mass, Pelvis Stable, Tender (Tenderness in the right 

upper quadrant mostly but also diffusely tender around the abdomen.)


Rectal (Female) Exam: Heme - Stool


Extremities: Normal Inspection, Normal Range of Motion, Non-Tender, No Pedal 

Edema, Normal Capillary Refill


Peripheral Pulses: 2+: Radial (L), Radial (R), Posterior Tibial (L), Posterior 

Tibial (R)


Skin: Warm, Dry, Intact


Neuro Extensive - Mental Status: Alert, Oriented x3, Normal Mood/Affect, Normal 

Cognition


Psychiatric: Alert, Normal Affect, Normal Mood





- Patient Data


Lab Results Last 24 hrs: 


 Laboratory Results - last 24 hr











  04/20/18 Range/Units





  14:55 


 


Blood Type  B POSITIVE  


 


Antibody Screen  NEGATIVE  


 


Crossmatch  See Detail  














- Problem List


(1) Urinary tract infection


SNOMED Code(s): 11759778


   ICD Code: N39.0 - URINARY TRACT INFECTION, SITE NOT SPECIFIED   Status: 

Acute   Current Visit: Yes   





(2) Abdominal pain


SNOMED Code(s): 78707109


   ICD Code: R10.9 - UNSPECIFIED ABDOMINAL PAIN   Status: Acute   Current Visit

: Yes   





(3) Heart palpitations


SNOMED Code(s): 24794028


   ICD Code: R00.2 - PALPITATIONS   Status: Acute   Current Visit: Yes   





(4) Anemia


SNOMED Code(s): 622832874


   ICD Code: D64.9 - ANEMIA, UNSPECIFIED   Status: Acute   Current Visit: Yes   


Problem List Initiated/Reviewed/Updated: Yes


Orders Last 24hrs: 


 Active Orders 24 hr











 Category Date Time Status


 


 Admission Status [Patient Status] [ADT] Stat ADT  04/20/18 16:09 Active


 


 HIDA with EF [Cholescintigraphy w Pharm Int] [NM] Exams  04/20/18 16:45 Ordered





 Routine   


 


 CBC WITH AUTO DIFF [HEME] AM Lab  04/21/18 05:11 Ordered


 


 CBC WITH AUTO DIFF [HEME] AM Lab  04/22/18 05:11 Ordered


 


 CBC WITH AUTO DIFF [HEME] AM Lab  04/23/18 05:11 Ordered


 


 CBC WITH AUTO DIFF [HEME] Routine Lab  04/20/18 16:43 Ordered


 


 CMP [COMPREHENSIVE METABOLIC PN,CMP] [CHEM] Routine Lab  04/20/18 16:43 Ordered


 


 COMPREHENSIVE METABOLIC PN,CMP [CHEM] AM Lab  04/21/18 05:11 Ordered


 


 COMPREHENSIVE METABOLIC PN,CMP [CHEM] AM Lab  04/22/18 05:11 Ordered


 


 COMPREHENSIVE METABOLIC PN,CMP [CHEM] AM Lab  04/23/18 05:11 Ordered


 


 Guaiac [OCCULT BLOOD DIAGNOSTIC] [OP] Stat Lab  04/20/18 15:36 Ordered


 


 HELICOBACTER PYLORI AB IGG [CHEM] Routine Lab  04/20/18 17:05 Ordered


 


 IRON/TIBC [CHEM] Routine Lab  04/20/18 16:43 Ordered


 


 LIPASE [CHEM] Routine Lab  04/20/18 17:05 Ordered


 


 PACKED CELLS [RED BLOOD CELLS LP] [BBK] Stat Lab  04/20/18 14:55 Results


 


 PERIPH BLOOD SMEAR PATHOLOGIST [HEME] Routine Lab  04/20/18 16:43 Ordered


 


 TYPE AND SCREEN [BBK] Stat Lab  04/20/18 14:55 Results


 


 VITAMIN B12 [CHEM] Routine Lab  04/20/18 16:43 Ordered


 


 Sodium Chloride 0.9% [Normal Saline] 1,000 ml Med  04/20/18 14:45 Active





 IV STAT   








 Medication Orders





Sodium Chloride (Normal Saline)  1,000 mls @ 125 mls/hr IV STAT THOR


   Last Admin: 04/20/18 15:09  Dose: 125 mls/hr








Assessment/Plan Comment:: 


42-year-old female is being admitted with abdominal pain and anemia.





#1. Anemia:


-CBC shows a hemoglobin of 7.5. Patient will be transfused 2 units of packed 

red blood cells. Follow-up CBC to ensure that hemoglobin is climbing.


-Anemia could be secondary to a history of uterine fibroids or menorrhagia. 

Patient is seeing an OB/GYN on April 30 to discuss a possible hysterectomy.


-Iron studies and B12 are pending.


-We'll start the patient on daily iron.





#2. Abdominal pain:


-CMP was unremarkable including LFTs. Lipase and amylase were normal. HIDA scan 

has been ordered but cannot be done until Monday. The patient may need to have 

this done as an outpatient. Patient may need an abdominal CT as she also has 

epigastric pain and some left upper quadrant pain but her main concern is the 

right upper quadrant pain.


-Patient will be started on daily Protonix as she has a history of reflux.


-Patient will be started on as needed zofran.





#3. UTI:


-Urinalysis shows +2 bacteria. Patient will be started on Bactrim.





#4. Fluttering of the heart:


-EKG will be ordered. Patient will be placed on telemetry.


-Will check magnesium and phosphorus.





DVT prophylaxis: SCDs





Disposition: 1-2 days pending improvement.

## 2018-04-21 LAB
CHLORIDE SERPL-SCNC: 107 MMOL/L (ref 98–107)
SODIUM SERPL-SCNC: 138 MMOL/L (ref 136–145)

## 2018-04-21 NOTE — PCM.CONS
H&P History of Present Illness





- General


Date of Service: 04/21/18


Admit Problem/Dx: 


 Admission Diagnosis/Problem





Admission Diagnosis/Problem      Anemia, abdominal pain








Source of Information: Patient


History Limitations: Reports: No Limitations





- History of Present Illness


Initial Comments - Free Text/Narative: 


Patient presented to the ED yesterday with anemia and severe RUQ pain. She 

states that she has had intermittent abdominal pain in her RUQ and epigastric 

area for the past 3-4 years but that last 3 days she developed severe pain that 

did not improve. This was associated with heartburn which she hasnt had since 

before her gastric bypass. She has had intermittent diarrhea but denies any 

black tarry stool. She denies hematochezia. SHe also has menorrhagia. She has 

an upcoming appointment with an OB/GYN on April 30 to discuss a possible 

hysterectomy. She has a history of kidney stones and said that the pain was as 

severe as when she had kidney stones but not similar in location. She has also 

felt tired and weak. 


She was found to be positive for H. pylori. Her hemoglobin was 7.5. She was 

treated for H pylori infection and given blood. This morning her pain is gone 

and she feels more energetic. She tolerated a regular diet and states that she 

feels much better. She denies any BM since admission. 


  ** RUQ Pain


Pain Score (Numeric/FACES): 0





- Related Data


Allergies/Adverse Reactions: 


 Allergies











Allergy/AdvReac Type Severity Reaction Status Date / Time


 


adhesive Allergy  Hives Verified 04/20/18 14:46


 


latex Allergy  Hives Verified 04/20/18 14:46


 


morphine Allergy  Anaphylactic Verified 04/20/18 14:46





   Shock  


 


povidone-iodine Allergy  Hives Verified 04/20/18 14:46





[From Betadine]     


 


soap [From Betadine] Allergy  Hives Verified 04/20/18 14:46











Home Medications: 


 Home Meds





Losartan [Cozaar] 100 mg PO DAILY 04/20/18 [History]











Past Medical History


HEENT History: Reports: None


Cardiovascular History: Reports: Hypertension


Respiratory History: Reports: None


Gastrointestinal History: Reports: None


Genitourinary History: Reports: None


OB/GYN History: Reports: Pregnancy


Musculoskeletal History: Reports: None


Neurological History: Reports: None


Psychiatric History: Reports: Anxiety


Endocrine/Metabolic History: Reports: None


Hematologic History: Reports: None


Immunologic History: Reports: None


Oncologic (Cancer) History: Reports: None


Dermatologic History: Reports: None





- Infectious Disease History


Infectious Disease History: Reports: None





- Past Surgical History


Head Surgeries/Procedures: Reports: None


GI Surgical History: Reports: Bariatric Procedure


Female  Surgical History: Reports: None, Tubal Ligation





Social & Family History





- Family History


Family Medical History: Noncontributory


Cardiac: Reports: Other (See Below)


Other Cardiac Family History: Heart Disease


Endocrine/Metabolic: Reports: Diabetes, type II


Oncologic: Reports: Other (See Below)


Other Oncologic Family History: Cancer





- Tobacco Use


Smoking Status *Q: Never Smoker


Second Hand Smoke Exposure: No





- Caffeine Use


Caffeine Use: Reports: None





- Recreational Drug Use


Recreational Drug Use: No





H&P Review of Systems





- Review of Systems:


Review Of Systems: ROS reveals no pertinent complaints other than HPI.





Exam





- Exam


Exam: See Below





- Vital Signs


Vital Signs: 


 Last Vital Signs











Temp  36.9 C   04/21/18 07:37


 


Pulse  81   04/21/18 07:37


 


Resp  14   04/21/18 07:37


 


BP  130/80   04/21/18 09:15


 


Pulse Ox  98   04/21/18 07:37











Weight: 77.1 kg





- Exam


General: Alert, Oriented


HEENT: Conjunctiva Clear, Mucosa Moist & Pink, Posterior Pharynx Clear, Pupils 

Equal, Pupils Reactive


Lungs: Clear to Auscultation, Normal Respiratory Effort


Cardiovascular: Regular Rate, Regular Rhythm


GI/Abdominal Exam: Soft, Non-Tender, No Distention, No Mass


Back Exam: Normal Inspection


Extremities: Normal Inspection





- Patient Data


Lab Results Last 24 hrs: 


 Laboratory Results - last 24 hr











  04/20/18 04/20/18 04/20/18 Range/Units





  11:20 11:20 11:20 


 


WBC     (4.0-11.0)  K/uL


 


RBC     (4.30-5.90)  M/uL


 


Hgb     (12.0-16.0)  g/dL


 


Hct     (36.0-46.0)  %


 


MCV     (80.0-98.0)  fL


 


MCH     (27.0-32.0)  pg


 


MCHC     (31.0-37.0)  g/dL


 


RDW Std Deviation     (28.0-62.0)  fl


 


RDW Coeff of Gonsalo     (11.0-15.0)  %


 


Plt Count     (150-400)  K/uL


 


Neut % (Auto)     (48.0-80.0)  %


 


Lymph % (Auto)     (16.0-40.0)  %


 


Mono % (Auto)     (0.0-15.0)  %


 


Eos % (Auto)     (0.0-7.0)  %


 


Baso % (Auto)     (0.0-1.5)  %


 


Neut # (Auto)     (1.4-5.7)  K/uL


 


Lymph # (Auto)     (0.6-2.4)  K/uL


 


Mono # (Auto)     (0.0-0.8)  K/uL


 


Eos # (Auto)     (0.0-0.7)  K/uL


 


Baso # (Auto)     (0.0-0.1)  K/uL


 


Nucleated RBC %     /100WBC


 


Nucleated RBCs #     K/uL


 


Smear Path Review  SENT TO PATHOLOGY    


 


Sodium     (136-145)  mmol/L


 


Potassium     (3.5-5.1)  mmol/L


 


Chloride     ()  mmol/L


 


Carbon Dioxide     (21.0-32.0)  mmol/L


 


BUN     (7.0-18.0)  mg/dL


 


Creatinine     (0.6-1.0)  mg/dL


 


Est Cr Clr Drug Dosing     mL/min


 


Estimated GFR (MDRD)     ml/min


 


Glucose     ()  mg/dL


 


Calcium     (8.5-10.1)  mg/dL


 


Phosphorus     (2.6-4.7)  mg/dL


 


Magnesium     (1.5-2.0)  mg/dL


 


Iron    13 L  ()  ug/dL


 


TIBC    481 H  (250-450)  ug/dL


 


% Saturation    2.70 L  (20-55)  %


 


Total Bilirubin     (0.2-1.0)  mg/dL


 


AST     (15-37)  IU/L


 


ALT     (14-63)  IU/L


 


Alkaline Phosphatase     ()  U/L


 


Total Protein     (6.4-8.2)  g/dL


 


Albumin     (3.4-5.0)  g/dL


 


Globulin     (2.0-3.5)  g/dL


 


Albumin/Globulin Ratio     (1.3-2.8)  


 


Vitamin B12   1109 H   (193-986)  pg/mL


 


H. pylori IgG Antibody     (NEG)  


 


Blood Type     


 


Antibody Screen     


 


Crossmatch     














  04/20/18 04/20/18 04/20/18 Range/Units





  11:20 11:20 14:55 


 


WBC     (4.0-11.0)  K/uL


 


RBC     (4.30-5.90)  M/uL


 


Hgb     (12.0-16.0)  g/dL


 


Hct     (36.0-46.0)  %


 


MCV     (80.0-98.0)  fL


 


MCH     (27.0-32.0)  pg


 


MCHC     (31.0-37.0)  g/dL


 


RDW Std Deviation     (28.0-62.0)  fl


 


RDW Coeff of Gonsalo     (11.0-15.0)  %


 


Plt Count     (150-400)  K/uL


 


Neut % (Auto)     (48.0-80.0)  %


 


Lymph % (Auto)     (16.0-40.0)  %


 


Mono % (Auto)     (0.0-15.0)  %


 


Eos % (Auto)     (0.0-7.0)  %


 


Baso % (Auto)     (0.0-1.5)  %


 


Neut # (Auto)     (1.4-5.7)  K/uL


 


Lymph # (Auto)     (0.6-2.4)  K/uL


 


Mono # (Auto)     (0.0-0.8)  K/uL


 


Eos # (Auto)     (0.0-0.7)  K/uL


 


Baso # (Auto)     (0.0-0.1)  K/uL


 


Nucleated RBC %     /100WBC


 


Nucleated RBCs #     K/uL


 


Smear Path Review     


 


Sodium     (136-145)  mmol/L


 


Potassium     (3.5-5.1)  mmol/L


 


Chloride     ()  mmol/L


 


Carbon Dioxide     (21.0-32.0)  mmol/L


 


BUN     (7.0-18.0)  mg/dL


 


Creatinine     (0.6-1.0)  mg/dL


 


Est Cr Clr Drug Dosing     mL/min


 


Estimated GFR (MDRD)     ml/min


 


Glucose     ()  mg/dL


 


Calcium     (8.5-10.1)  mg/dL


 


Phosphorus   3.8   (2.6-4.7)  mg/dL


 


Magnesium   1.6   (1.5-2.0)  mg/dL


 


Iron     ()  ug/dL


 


TIBC     (250-450)  ug/dL


 


% Saturation     (20-55)  %


 


Total Bilirubin     (0.2-1.0)  mg/dL


 


AST     (15-37)  IU/L


 


ALT     (14-63)  IU/L


 


Alkaline Phosphatase     ()  U/L


 


Total Protein     (6.4-8.2)  g/dL


 


Albumin     (3.4-5.0)  g/dL


 


Globulin     (2.0-3.5)  g/dL


 


Albumin/Globulin Ratio     (1.3-2.8)  


 


Vitamin B12     (193-986)  pg/mL


 


H. pylori IgG Antibody  POSITIVE H    (NEG)  


 


Blood Type    B POSITIVE  


 


Antibody Screen    NEGATIVE  


 


Crossmatch    See Detail  














  04/21/18 04/21/18 04/21/18 Range/Units





  01:30 05:33 05:33 


 


WBC   7.91   (4.0-11.0)  K/uL


 


RBC   4.65   (4.30-5.90)  M/uL


 


Hgb  9.4 L  9.0 L   (12.0-16.0)  g/dL


 


Hct  30.7 L  29.6 L   (36.0-46.0)  %


 


MCV   63.7 L   (80.0-98.0)  fL


 


MCH   19.4 L   (27.0-32.0)  pg


 


MCHC   30.4 L   (31.0-37.0)  g/dL


 


RDW Std Deviation   53.8   (28.0-62.0)  fl


 


RDW Coeff of Gonsalo   24 H   (11.0-15.0)  %


 


Plt Count   275   (150-400)  K/uL


 


Neut % (Auto)   53.9   (48.0-80.0)  %


 


Lymph % (Auto)   35.1   (16.0-40.0)  %


 


Mono % (Auto)   9.7   (0.0-15.0)  %


 


Eos % (Auto)   0.9   (0.0-7.0)  %


 


Baso % (Auto)   0.4   (0.0-1.5)  %


 


Neut # (Auto)   4.3   (1.4-5.7)  K/uL


 


Lymph # (Auto)   2.8 H   (0.6-2.4)  K/uL


 


Mono # (Auto)   0.8   (0.0-0.8)  K/uL


 


Eos # (Auto)   0.1   (0.0-0.7)  K/uL


 


Baso # (Auto)   0.0   (0.0-0.1)  K/uL


 


Nucleated RBC %   0.0   /100WBC


 


Nucleated RBCs #   0   K/uL


 


Smear Path Review     


 


Sodium    138  (136-145)  mmol/L


 


Potassium    3.6  (3.5-5.1)  mmol/L


 


Chloride    107  ()  mmol/L


 


Carbon Dioxide    22.8  (21.0-32.0)  mmol/L


 


BUN    6 L  (7.0-18.0)  mg/dL


 


Creatinine    0.6  (0.6-1.0)  mg/dL


 


Est Cr Clr Drug Dosing    109.73  mL/min


 


Estimated GFR (MDRD)    > 60.0  ml/min


 


Glucose    74  ()  mg/dL


 


Calcium    8.6  (8.5-10.1)  mg/dL


 


Phosphorus     (2.6-4.7)  mg/dL


 


Magnesium     (1.5-2.0)  mg/dL


 


Iron     ()  ug/dL


 


TIBC     (250-450)  ug/dL


 


% Saturation     (20-55)  %


 


Total Bilirubin    1.1 H  (0.2-1.0)  mg/dL


 


AST    17  (15-37)  IU/L


 


ALT    9 L  (14-63)  IU/L


 


Alkaline Phosphatase    80  ()  U/L


 


Total Protein    6.8  (6.4-8.2)  g/dL


 


Albumin    3.1 L  (3.4-5.0)  g/dL


 


Globulin    3.7 H  (2.0-3.5)  g/dL


 


Albumin/Globulin Ratio    0.8 L  (1.3-2.8)  


 


Vitamin B12     (193-986)  pg/mL


 


H. pylori IgG Antibody     (NEG)  


 


Blood Type     


 


Antibody Screen     


 


Crossmatch     











Result Diagrams: 


 04/21/18 05:33





 04/21/18 05:33


Frank Results Last 24 hrs: 


 Microbiology











 04/20/18 15:00 Stool Occult Blood (FRANK) - Final





 Stool / Feces    NEGATIVE OCCULT BLOOD














Consult PN Assessment/Plan


Procedures: 


Procedures





ASSAY OF FREE THYROXINE (03/14/18)


ASSAY OF TROPONIN QUANT (12/19/17)


ASSAY THYROID STIM HORMONE (03/12/18)


ASSAY TRIIODOTHYRONINE (T3) (03/14/18)


CHEST X-RAY 1 VIEW FRONTAL (12/19/17)


COMPLETE CBC W/AUTO DIFF WBC (12/19/17)


COMPREHEN METABOLIC PANEL (12/19/17)


ELECTROCARDIOGRAM TRACING (12/19/17)


EMERGENCY DEPT VISIT (02/16/18)


EMERGENCY DEPT VISIT (12/19/17)


GLYCOSYLATED HEMOGLOBIN TEST (03/12/18)


HYDRATE IV INFUSION ADD-ON (12/19/17)


METABOLIC PANEL TOTAL CA (03/12/18)


ROUTINE VENIPUNCTURE (03/14/18)


THER/PROPH/DIAG INJ IV PUSH (12/19/17)


THER/PROPH/DIAG INJ SC/IM (02/16/18)


TRANSVAGINAL US NON-OB (03/26/18)


TX/PRO/DX INJ NEW DRUG ADDON (12/19/17)


URINE CULTURE/COLONY COUNT (12/19/17)


VITAMIN B-12 (03/12/18)








(1) H. pylori infection


SNOMED Code(s): 568551375


   Code(s): A04.8 - OTHER SPECIFIED BACTERIAL INTESTINAL INFECTIONS   Current 

Visit: Yes   





(2) Abdominal pain


SNOMED Code(s): 88890573


   Code(s): R10.9 - UNSPECIFIED ABDOMINAL PAIN   Current Visit: Yes   


Problem List Initiated/Reviewed/Updated: Yes


Plan: 


-H pylori infection: Treat with po clarithromycin, amoxicillin and PPI. Also 

give patient sucralfate or carafate therapy for 2 weeks. 


-Will follow up in clinic for outpatient CT abdomen/pelvis with oral and IV 

contrast, EGD and colonoscopy, as well as possible HIDA scan to check for 

biliary dyskinesia





Patient and I discussed the pathophysiology of gastric ulcers in the context of 

a previous gastric bypass. We discussed H pylori infection and the need for 

antibiotic treatment and PPI therapy. We also touched on gallbladder disease 

specifically biliary dyskinesia. The patient feels well, appears stable 

clinically and from a lab basis and does not need any emergent endoscopy. If 

she should become unstable or have clinical decline please call me with any 

questions.

## 2018-04-21 NOTE — PCM.[email or web address]
**Discharge Summary





- Hospital Course


Free Text/Narrative:: 





  Patient admitted in the hospital with severe  anemia  at 7.5 mg/dl , due to 

bleeding from fibroids  and severe abdominal pain constant for the past 3 days 

, burning like , in the epigastrium  duodenal area and and was started on 

Clarithromycin and amoxicillin and Protonix 40 mg po BID.  She was transfused  

2 units of blood . US upper quadrant right was unremarkable . She was found 

positive for H pylori treatment    with Amoxicillin 1 gram po BId , 

Clarithromycin 500 mg po BID for 14 days and Nexium 40 mg po BID.She had 

Surgery consult and was recommended Ct abdomen as outpatient  also Surgery is 

planning EGD as outpatient. Patient pain resolved today. 


     Patient wanted to have Ct scan done before discharge and  to be called 

with the results or f/up results with Dr. Kenny .


    


   





- Discharge Data


Discharge Date: 04/21/18


Discharge Disposition: Home, Self-Care 01


Condition: Stable





- Discharge Diagnosis/Problem(s)


(1) Anemia due to acute blood loss


SNOMED Code(s): 310240457


   ICD Code: D62 - ACUTE POSTHEMORRHAGIC ANEMIA   Status: Acute   





(2) Abdominal pain


SNOMED Code(s): 19235645


   ICD Code: R10.9 - UNSPECIFIED ABDOMINAL PAIN   Status: Acute   





(3) Gastritis, Helicobacter pylori


SNOMED Code(s): 152468175


   ICD Code: K29.70 - GASTRITIS, UNSPECIFIED, WITHOUT BLEEDING; B96.81 - 

HELICOBACTER PYLORI AS THE CAUSE OF DISEASES CLASSD ELSWHR   Status: Acute   





(4) Intermittent palpitations


SNOMED Code(s): 435364165


   ICD Code: R00.2 - PALPITATIONS   Status: Acute   





- Patient Summary/Data


Consults: 


 Consultations





04/21/18 08:49


Consult to Physician [CONS] Urgent 














- Patient Instructions


Diet: Heart Healthy Diet


Activity: As Tolerated


Driving: May Drive Today


Showering/Bathing: May Shower





- Discharge Plan


Prescriptions/Med Rec: 


Amoxicillin [IJD: Amoxicillin] 1,000 mg PO Q12HR 14 Days #56 capsule


Clarithromycin [Biaxin] 500 mg PO BID 14 Days #28 tablet


Ferrous Sulfate 325 mg PO TIDMEALS 60 Days #180 tablet


Pantoprazole [ProTONIX***] 40 mg PO BIDAC 30 Days #60 tab.cr


Sucralfate [Carafate] 1 gm PO Q6H 30 Days #2 cup


Home Medications: 


 Home Meds





Losartan [Cozaar] 100 mg PO DAILY 04/20/18 [History]


Acetaminophen [Tylenol] 650 mg PO Q4H PRN  tablet 04/21/18 [Rx]


Amoxicillin [IJD: Amoxicillin] 1,000 mg PO Q12HR 14 Days #56 capsule 04/21/18 [

Rx]


Clarithromycin [Biaxin] 500 mg PO BID 14 Days #28 tablet 04/21/18 [Rx]


Ferrous Sulfate 325 mg PO TIDMEALS 60 Days #180 tablet 04/21/18 [Rx]


Pantoprazole [ProTONIX***] 40 mg PO BIDAC 30 Days #60 tab.cr 04/21/18 [Rx]


Sucralfate [Carafate] 1 gm PO Q6H 30 Days #2 cup 04/21/18 [Rx]








Patient Handouts:  Amoxicillin capsules or tablets, Anemia, Urinary Tract 

Infection, Adult, Easy-to-Read, Abdominal Pain, Adult, Easy-to-Read, Sucralfate 

tablets, Pantoprazole tablets, Clarithromycin tablets


Referrals: 


Julio César Rodriguez MD [Primary Care Provider] - 


Marisol Anderson MD [Physician] - 


Sallie Kenny MD [Physician] - 





- Patient Data


Vitals - Most Recent: 


 Last Vital Signs











Temp  98.5 F   04/21/18 07:37


 


Pulse  81   04/21/18 07:37


 


Resp  14   04/21/18 07:37


 


BP  130/80   04/21/18 09:15


 


Pulse Ox  98   04/21/18 07:37











Weight - Most Recent: 169 lb 15.622 oz


I&O - Last 24 hours: 


 Intake & Output











 04/21/18 04/21/18 04/21/18





 06:59 14:59 22:59


 


Intake Total 1103 825 


 


Output Total 700 800 


 


Balance 403 25 











Lab Results - Last 24 hrs: 


 Laboratory Results - last 24 hr











  04/20/18 04/20/18 04/20/18 Range/Units





  11:20 11:20 11:20 


 


WBC     (4.0-11.0)  K/uL


 


RBC     (4.30-5.90)  M/uL


 


Hgb     (12.0-16.0)  g/dL


 


Hct     (36.0-46.0)  %


 


MCV     (80.0-98.0)  fL


 


MCH     (27.0-32.0)  pg


 


MCHC     (31.0-37.0)  g/dL


 


RDW Std Deviation     (28.0-62.0)  fl


 


RDW Coeff of Gonsalo     (11.0-15.0)  %


 


Plt Count     (150-400)  K/uL


 


Neut % (Auto)     (48.0-80.0)  %


 


Lymph % (Auto)     (16.0-40.0)  %


 


Mono % (Auto)     (0.0-15.0)  %


 


Eos % (Auto)     (0.0-7.0)  %


 


Baso % (Auto)     (0.0-1.5)  %


 


Neut # (Auto)     (1.4-5.7)  K/uL


 


Lymph # (Auto)     (0.6-2.4)  K/uL


 


Mono # (Auto)     (0.0-0.8)  K/uL


 


Eos # (Auto)     (0.0-0.7)  K/uL


 


Baso # (Auto)     (0.0-0.1)  K/uL


 


Nucleated RBC %     /100WBC


 


Nucleated RBCs #     K/uL


 


Smear Path Review  SENT TO PATHOLOGY    


 


Sodium     (136-145)  mmol/L


 


Potassium     (3.5-5.1)  mmol/L


 


Chloride     ()  mmol/L


 


Carbon Dioxide     (21.0-32.0)  mmol/L


 


BUN     (7.0-18.0)  mg/dL


 


Creatinine     (0.6-1.0)  mg/dL


 


Est Cr Clr Drug Dosing     mL/min


 


Estimated GFR (MDRD)     ml/min


 


Glucose     ()  mg/dL


 


Calcium     (8.5-10.1)  mg/dL


 


Phosphorus     (2.6-4.7)  mg/dL


 


Magnesium     (1.5-2.0)  mg/dL


 


Iron    13 L  ()  ug/dL


 


TIBC    481 H  (250-450)  ug/dL


 


% Saturation    2.70 L  (20-55)  %


 


Total Bilirubin     (0.2-1.0)  mg/dL


 


AST     (15-37)  IU/L


 


ALT     (14-63)  IU/L


 


Alkaline Phosphatase     ()  U/L


 


Total Protein     (6.4-8.2)  g/dL


 


Albumin     (3.4-5.0)  g/dL


 


Globulin     (2.0-3.5)  g/dL


 


Albumin/Globulin Ratio     (1.3-2.8)  


 


Vitamin B12   1109 H   (193-986)  pg/mL


 


H. pylori IgG Antibody     (NEG)  


 


Blood Type     


 


Antibody Screen     


 


Crossmatch     














  04/20/18 04/20/18 04/20/18 Range/Units





  11:20 11:20 14:55 


 


WBC     (4.0-11.0)  K/uL


 


RBC     (4.30-5.90)  M/uL


 


Hgb     (12.0-16.0)  g/dL


 


Hct     (36.0-46.0)  %


 


MCV     (80.0-98.0)  fL


 


MCH     (27.0-32.0)  pg


 


MCHC     (31.0-37.0)  g/dL


 


RDW Std Deviation     (28.0-62.0)  fl


 


RDW Coeff of Gonsalo     (11.0-15.0)  %


 


Plt Count     (150-400)  K/uL


 


Neut % (Auto)     (48.0-80.0)  %


 


Lymph % (Auto)     (16.0-40.0)  %


 


Mono % (Auto)     (0.0-15.0)  %


 


Eos % (Auto)     (0.0-7.0)  %


 


Baso % (Auto)     (0.0-1.5)  %


 


Neut # (Auto)     (1.4-5.7)  K/uL


 


Lymph # (Auto)     (0.6-2.4)  K/uL


 


Mono # (Auto)     (0.0-0.8)  K/uL


 


Eos # (Auto)     (0.0-0.7)  K/uL


 


Baso # (Auto)     (0.0-0.1)  K/uL


 


Nucleated RBC %     /100WBC


 


Nucleated RBCs #     K/uL


 


Smear Path Review     


 


Sodium     (136-145)  mmol/L


 


Potassium     (3.5-5.1)  mmol/L


 


Chloride     ()  mmol/L


 


Carbon Dioxide     (21.0-32.0)  mmol/L


 


BUN     (7.0-18.0)  mg/dL


 


Creatinine     (0.6-1.0)  mg/dL


 


Est Cr Clr Drug Dosing     mL/min


 


Estimated GFR (MDRD)     ml/min


 


Glucose     ()  mg/dL


 


Calcium     (8.5-10.1)  mg/dL


 


Phosphorus   3.8   (2.6-4.7)  mg/dL


 


Magnesium   1.6   (1.5-2.0)  mg/dL


 


Iron     ()  ug/dL


 


TIBC     (250-450)  ug/dL


 


% Saturation     (20-55)  %


 


Total Bilirubin     (0.2-1.0)  mg/dL


 


AST     (15-37)  IU/L


 


ALT     (14-63)  IU/L


 


Alkaline Phosphatase     ()  U/L


 


Total Protein     (6.4-8.2)  g/dL


 


Albumin     (3.4-5.0)  g/dL


 


Globulin     (2.0-3.5)  g/dL


 


Albumin/Globulin Ratio     (1.3-2.8)  


 


Vitamin B12     (193-986)  pg/mL


 


H. pylori IgG Antibody  POSITIVE H    (NEG)  


 


Blood Type    B POSITIVE  


 


Antibody Screen    NEGATIVE  


 


Crossmatch    See Detail  














  04/21/18 04/21/18 04/21/18 Range/Units





  01:30 05:33 05:33 


 


WBC   7.91   (4.0-11.0)  K/uL


 


RBC   4.65   (4.30-5.90)  M/uL


 


Hgb  9.4 L  9.0 L   (12.0-16.0)  g/dL


 


Hct  30.7 L  29.6 L   (36.0-46.0)  %


 


MCV   63.7 L   (80.0-98.0)  fL


 


MCH   19.4 L   (27.0-32.0)  pg


 


MCHC   30.4 L   (31.0-37.0)  g/dL


 


RDW Std Deviation   53.8   (28.0-62.0)  fl


 


RDW Coeff of Gonsalo   24 H   (11.0-15.0)  %


 


Plt Count   275   (150-400)  K/uL


 


Neut % (Auto)   53.9   (48.0-80.0)  %


 


Lymph % (Auto)   35.1   (16.0-40.0)  %


 


Mono % (Auto)   9.7   (0.0-15.0)  %


 


Eos % (Auto)   0.9   (0.0-7.0)  %


 


Baso % (Auto)   0.4   (0.0-1.5)  %


 


Neut # (Auto)   4.3   (1.4-5.7)  K/uL


 


Lymph # (Auto)   2.8 H   (0.6-2.4)  K/uL


 


Mono # (Auto)   0.8   (0.0-0.8)  K/uL


 


Eos # (Auto)   0.1   (0.0-0.7)  K/uL


 


Baso # (Auto)   0.0   (0.0-0.1)  K/uL


 


Nucleated RBC %   0.0   /100WBC


 


Nucleated RBCs #   0   K/uL


 


Smear Path Review     


 


Sodium    138  (136-145)  mmol/L


 


Potassium    3.6  (3.5-5.1)  mmol/L


 


Chloride    107  ()  mmol/L


 


Carbon Dioxide    22.8  (21.0-32.0)  mmol/L


 


BUN    6 L  (7.0-18.0)  mg/dL


 


Creatinine    0.6  (0.6-1.0)  mg/dL


 


Est Cr Clr Drug Dosing    109.73  mL/min


 


Estimated GFR (MDRD)    > 60.0  ml/min


 


Glucose    74  ()  mg/dL


 


Calcium    8.6  (8.5-10.1)  mg/dL


 


Phosphorus     (2.6-4.7)  mg/dL


 


Magnesium     (1.5-2.0)  mg/dL


 


Iron     ()  ug/dL


 


TIBC     (250-450)  ug/dL


 


% Saturation     (20-55)  %


 


Total Bilirubin    1.1 H  (0.2-1.0)  mg/dL


 


AST    17  (15-37)  IU/L


 


ALT    9 L  (14-63)  IU/L


 


Alkaline Phosphatase    80  ()  U/L


 


Total Protein    6.8  (6.4-8.2)  g/dL


 


Albumin    3.1 L  (3.4-5.0)  g/dL


 


Globulin    3.7 H  (2.0-3.5)  g/dL


 


Albumin/Globulin Ratio    0.8 L  (1.3-2.8)  


 


Vitamin B12     (193-986)  pg/mL


 


H. pylori IgG Antibody     (NEG)  


 


Blood Type     


 


Antibody Screen     


 


Crossmatch     











[facility] Results - Last 24 hrs: 


 Microbiology











 04/20/18 15:00 Stool Occult Blood ([facility]) - Final





 Stool / Feces    NEGATIVE OCCULT BLOOD











Med Orders - Current: 


 Current Medications








Discontinued Medications





Acetaminophen (Tylenol)  650 mg PO Q4H PRN


   PRN Reason: Pain (Mild 1-3)/fever


   Last Admin: 04/21/18 10:51 Dose:  650 mg


Amoxicillin (Amoxil)  1,000 mg PO Q12HR THOR


   Last Admin: 04/21/18 09:16 Dose:  1,000 mg


Clarithromycin (Biaxin)  500 mg PO BID [facility]


   Last Admin: 04/21/18 09:16 Dose:  500 mg


Ferrous Sulfate (Ferrous Sulfate)  325 mg PO TIDMEALS [facility]


   Last Admin: 04/21/18 11:45 Dose:  325 mg


Hydromorphone HCl (Dilaudid)  1 mg IVPUSH ONETIME ONE


   Stop: 04/20/18 14:53


   Last Admin: 04/20/18 15:10 Dose:  1 mg


Sodium Chloride (Normal Saline)  1,000 mls @ 125 mls/hr IV STAT [facility]


   Last Admin: 04/21/18 02:43 Dose:  125 mls/hr


Iopamidol (Isovue-370 (76%))  100 ml IVPUSH ONETIME STA


   Stop: 04/21/18 12:34


   Last Admin: 04/21/18 12:34 Dose:  100 ml


Losartan Potassium (Cozaar)  100 mg PO DAILY [facility]


   Last Admin: 04/21/18 09:15 Dose:  100 mg


Ondansetron HCl (Zofran Odt)  4 mg PO ONETIME PRN


   PRN Reason: Nausea/Vomiting


   Last Admin: 04/20/18 17:49 Dose:  4 mg


Pantoprazole Sodium (Protonix Iv***)  80 mg IVPUSH .BOLUS ONE


   Stop: 04/20/18 14:59


   Last Admin: 04/20/18 15:10 Dose:  80 mg


Pantoprazole Sodium (Protonix***)  40 mg PO BIDAC [facility]


   Last Admin: 04/21/18 06:35 Dose:  40 mg


Sucralfate (Carafate)  1 gm PO Q6H [facility]


   Last Admin: 04/21/18 09:15 Dose:  1 gm


Trimethoprim/Sulfamethoxazole (Septra Ds)  1 tab PO BID [facility]


   Last Admin: 04/20/18 18:57 Dose:  Not Given

## 2018-04-22 NOTE — PCM.SN
- Free Text/Narrative


Note: 





 Patient was called home and left voice message to discuss the results of the 

Ct scan of the abdomen which shows she has a right with hydroureter. No 

obstructing stone seen.Dw Dr. Mcclelland , he will see the patient in his office 

tomorrow at 1 pm  .

## 2018-04-23 NOTE — CT
EXAM DATE: 18



PATIENT'S AGE: 42





Patient: GROVER GARCIA



Facility: [place], [place]

Patient ID: [id number]

Site Patient ID: [id number].

Site Accession #: [id number]

: 1975

Study: CT Abdomen/Pelvis W/ and W/O Cont [id number]/2018 12:36:23 PM

Ordering Physician: Teresa Fuentes



Final Report: 

abdominal pain. 

Contrast-enhanced CT abdomen and pelvis with coronal sagittal re-formatted 
images obtained. 



COMPARISON:

No comparison Studies are available. 

Findings: 

The lung bases are clear. Normal heart size. Lung bases are clear. No 
pericardial or pleural effusion. Gastric bypass change. Gastric pouch is 
slightly prominent and filled with presumed food material. 

Pancreas adrenal glands and liver appears unremarkable. Gallbladder is 
unremarkable

Nonobstructing small stones right kidney. Multiple small low-density lesions in 
both kidneys that are too small to characterize and statistically would 
represent cysts. There is mild right-sided hydronephrosis and hydroureter. No 
obstructing stone seen. Urinary bladder is unremarkable. 

No abdominal aortic aneurysm. Normal appendix. 2 cm left ovarian cyst. Small 
amount of free fluid in the pelvis,nonspecific. No inflammatory change in 
abdomen pelvis.

Impression: 

1. Right mild hydronephrosis and hydroureter. No obstructing stones are seen. 
Additional nonobstructing renal calculi on the right. Normal appendix. 

Small amount of free fluid in the pelvis is nonspecific.



Please note that all CT scans at this facility use dose modulation, iterative 
reconstruction, and/or weight-based dosing when appropriate to reduce radiation 
dose to as low as reasonably achievable.



Dictated by Miya Traylor MD @ 2018 12:56PM

(Electronic Signature)



Report Signed by Proxy.
LINN

## 2018-05-02 LAB
CHLORIDE SERPL-SCNC: 105 MMOL/L (ref 98–107)
SODIUM SERPL-SCNC: 138 MMOL/L (ref 136–145)

## 2018-05-03 ENCOUNTER — HOSPITAL ENCOUNTER (OUTPATIENT)
Dept: HOSPITAL 56 - MW.SDS | Age: 43
LOS: 1 days | Discharge: HOME | End: 2018-05-04
Attending: OBSTETRICS & GYNECOLOGY
Payer: OTHER GOVERNMENT

## 2018-05-03 DIAGNOSIS — N39.3: ICD-10-CM

## 2018-05-03 DIAGNOSIS — Z88.5: ICD-10-CM

## 2018-05-03 DIAGNOSIS — N92.1: ICD-10-CM

## 2018-05-03 DIAGNOSIS — Z91.048: ICD-10-CM

## 2018-05-03 DIAGNOSIS — N83.8: ICD-10-CM

## 2018-05-03 DIAGNOSIS — Z88.8: ICD-10-CM

## 2018-05-03 DIAGNOSIS — E66.9: ICD-10-CM

## 2018-05-03 DIAGNOSIS — Z91.040: ICD-10-CM

## 2018-05-03 DIAGNOSIS — K21.9: ICD-10-CM

## 2018-05-03 DIAGNOSIS — D50.9: ICD-10-CM

## 2018-05-03 DIAGNOSIS — N80.0: ICD-10-CM

## 2018-05-03 DIAGNOSIS — N88.8: Primary | ICD-10-CM

## 2018-05-03 DIAGNOSIS — Z79.899: ICD-10-CM

## 2018-05-03 DIAGNOSIS — Z91.018: ICD-10-CM

## 2018-05-03 DIAGNOSIS — I10: ICD-10-CM

## 2018-05-03 PROCEDURE — 86900 BLOOD TYPING SEROLOGIC ABO: CPT

## 2018-05-03 PROCEDURE — 85027 COMPLETE CBC AUTOMATED: CPT

## 2018-05-03 PROCEDURE — 86850 RBC ANTIBODY SCREEN: CPT

## 2018-05-03 PROCEDURE — 85025 COMPLETE CBC W/AUTO DIFF WBC: CPT

## 2018-05-03 PROCEDURE — 58552 LAPARO-VAG HYST INCL T/O: CPT

## 2018-05-03 PROCEDURE — 57288 REPAIR BLADDER DEFECT: CPT

## 2018-05-03 PROCEDURE — 86901 BLOOD TYPING SEROLOGIC RH(D): CPT

## 2018-05-03 PROCEDURE — 36415 COLL VENOUS BLD VENIPUNCTURE: CPT

## 2018-05-03 PROCEDURE — 84703 CHORIONIC GONADOTROPIN ASSAY: CPT

## 2018-05-03 PROCEDURE — 80048 BASIC METABOLIC PNL TOTAL CA: CPT

## 2018-05-03 RX ADMIN — FENTANYL CITRATE PRN MCG: 50 INJECTION, SOLUTION INTRAMUSCULAR; INTRAVENOUS at 12:54

## 2018-05-03 RX ADMIN — FENTANYL CITRATE PRN MCG: 50 INJECTION, SOLUTION INTRAMUSCULAR; INTRAVENOUS at 12:49

## 2018-05-03 NOTE — [id number]
Post Anesthesia Note





- EVALUATION WITHIN 48HRS OF ANESTHETIC


Vital Signs in Normal Range: Yes


Patient Participated in Evaluation: Yes


Respiratory Function Stable: Yes


Airway Patent: Yes


Cardiovascular Function Stable: Yes


Hydration Status Stable: Yes


Pain Control Satisfactory: Yes


Nausea and Vomiting Control Satisfactory: Yes


Mental Status Recovered: Yes


Resp Rate: 16





- COMMENTS/OBSERVATIONS


Free Text/Narrative:: 





alert, minimal pain, no nausea

## 2018-05-03 NOTE — PCM.OPNOTE
- General Post-Op/Procedure Note


Date of Surgery/Procedure: 05/03/18


Operative Procedure(s): TLH,Leonides salpengectomy, Solyx tvt and cystoscopy.


Post-Op Diagnosis: Same


Anesthesia Technique: General ET Tube


Primary Surgeon: Slava Holder


Assistant: Layla Sorenson


EBL in mLs: 250


Complications: None


Condition: Good

## 2018-05-03 NOTE — PCM.PREANE
Preanesthetic Assessment





- Anesthesia/Transfusion/Family Hx


Anesthesia History: Prior Anesthesia Without Reaction


Family History of Anesthesia Reaction: No


Transfusion History: Prior Transfusion Without Reaction





- Review of Systems


General: No Symptoms


Pulmonary: No Symptoms


Cardiovascular: No Symptoms


Gastrointestinal: No Symptoms


Neurological: No Symptoms


Other: Reports: None





- Physical Assessment


NPO Status Date: 05/02/18


Height: 1.66 m


Weight: 77.564 kg


ASA Class: 2


Mental Status: Alert & Oriented x3


Airway Class: Mallampati = 1


Dentition: Reports: Dentures


ROM/Head Extension: Full


Lungs: Clear to Auscultation, Normal Respiratory Effort





- Lab


Values: 





 Laboratory Last Values











WBC  5.99 K/uL (4.0-11.0)   05/02/18  11:20    


 


RBC  5.14 M/uL (4.30-5.90)   05/02/18  11:20    


 


Hgb  10.3 g/dL (12.0-16.0)  L  05/02/18  11:20    


 


Hct  33.9 % (36.0-46.0)  L  05/02/18  11:20    


 


MCV  66.0 fL (80.0-98.0)  L  05/02/18  11:20    


 


MCH  20.0 pg (27.0-32.0)  L  05/02/18  11:20    


 


MCHC  30.4 g/dL (31.0-37.0)  L  05/02/18  11:20    


 


RDW Std Deviation  62.0 fl (28.0-62.0)   05/02/18  11:20    


 


RDW Coeff of Gonsalo  27 % (11.0-15.0)  H  05/02/18  11:20    


 


Plt Count  186 K/uL (150-400)   05/02/18  11:20    


 


Nucleated RBC %  0.0 /100WBC  05/02/18  11:20    


 


Nucleated RBCs #  0 K/uL  05/02/18  11:20    


 


Sodium  138 mmol/L (136-145)   05/02/18  11:20    


 


Potassium  3.7 mmol/L (3.5-5.1)   05/02/18  11:20    


 


Chloride  105 mmol/L ()   05/02/18  11:20    


 


Carbon Dioxide  24.3 mmol/L (21.0-32.0)   05/02/18  11:20    


 


BUN  7 mg/dL (7.0-18.0)   05/02/18  11:20    


 


Creatinine  0.8 mg/dL (0.6-1.0)   05/02/18  11:20    


 


Est Cr Clr Drug Dosing  84.09 mL/min  05/02/18  11:20    


 


Estimated GFR (MDRD)  > 60.0 ml/min  05/02/18  11:20    


 


Glucose  94 mg/dL ()   05/02/18  11:20    


 


Calcium  8.9 mg/dL (8.5-10.1)   05/02/18  11:20    


 


HCG, Qual  NEGATIVE  (NEG)   05/02/18  11:20    


 


Blood Type  B POSITIVE   05/02/18  11:20    


 


Antibody Screen  NEGATIVE   05/02/18  11:20    














- Allergies


Allergies/Adverse Reactions: 


 Allergies











Allergy/AdvReac Type Severity Reaction Status Date / Time


 


adhesive Allergy  Hives Verified 04/30/18 10:15


 


latex Allergy  Hives Verified 04/30/18 10:15


 


morphine Allergy  Anaphylactic Verified 04/30/18 10:15





   Shock  


 


povidone-iodine Allergy  Hives Verified 04/30/18 10:15





[From Betadine]     


 


soap [From Betadine] Allergy  Hives Verified 04/30/18 10:15














- Anesthesia Plan


Pre-Op Medication Ordered: None





- Acknowledgements


Anesthesia Type Planned: General Anesthesia


Pt an Appropriate Candidate for the Planned Anesthesia: Yes


Alternatives and Risks of Anesthesia Discussed w Pt/Guardian: Yes


Pt/Guardian Understands and Agrees with Anesthesia Plan: Yes


Additional Comments: 





PMH: htn, thyroid replacement, s/p gastric bypass


NOTE: allergy to morphine





PreAnesthesia Questionnaire


HEENT History: Reports: Other (See Below)


Other HEENT History: wears glasses, has top and bottom dentures


Cardiovascular History: Reports: Hypertension


Respiratory History: Reports: None


Gastrointestinal History: Reports: GERD


Genitourinary History: Reports: Renal Calculus


OB/GYN History: Reports: Pregnancy


Musculoskeletal History: Reports: None


Neurological History: Reports: None


Psychiatric History: Reports: Anxiety


Endocrine/Metabolic History: Reports: None


Hematologic History: Reports: Anemia, Blood Transfusion(s)


Immunologic History: Reports: None


Oncologic (Cancer) History: Reports: None


Dermatologic History: Reports: None





- Infectious Disease History


Infectious Disease History: Reports: None





- Past Surgical History


Head Surgeries/Procedures: Reports: None


GI Surgical History: Reports: Bariatric Procedure


Female  Surgical History: Reports: Tubal Ligation


Neurological Surgical History: Reports: None





- SUBSTANCE USE


Smoking Status *Q: Never Smoker


Tobacco Use Within Last Twelve Months: No


Second Hand Smoke Exposure: No


Recreational Drug Use History: No





- HOME MEDS


Home Medications: 


 Home Meds





Losartan [Cozaar] 100 mg PO DAILY 04/20/18 [History]


Acetaminophen [Tylenol] 650 mg PO Q4H PRN  tablet 04/21/18 [Rx]


Amoxicillin [IJD: Amoxicillin] 1,000 mg PO Q12HR 14 Days #56 capsule 04/21/18 [

Rx]


Clarithromycin [Biaxin] 500 mg PO BID 14 Days #28 tablet 04/21/18 [Rx]


Ferrous Sulfate 325 mg PO TIDMEALS 60 Days #180 tablet 04/21/18 [Rx]


Pantoprazole [ProTONIX***] 40 mg PO BIDAC 30 Days #60 tab.cr 04/21/18 [Rx]


Sucralfate [Carafate] 1 gm PO Q6H 30 Days #2 cup 04/21/18 [Rx]











- CURRENT (IN HOUSE) MEDS


Current Meds: 





 Current Medications





Lactated Ringer's (Ringers, Lactated)  1,000 mls @ 125 mls/hr IV ASDIRECTED THOR


   Last Admin: 05/03/18 08:48 Dose:  125 mls/hr


Sodium Chloride (Saline Flush)  10 ml FLUSH ASDIRECTED PRN


   PRN Reason: Keep Vein Open


Sodium Chloride (Saline Flush)  2.5 ml FLUSH ASDIRECTED PRN


   PRN Reason: Keep Vein Open





Discontinued Medications





Cefazolin Sodium/Dextrose 2 gm (/ Premix)  50 mls @ 100 mls/hr IV ONETIME ONE


   Stop: 05/02/18 09:11

## 2018-05-03 NOTE — OR
SURGEON:

Slava Holder MD

 

DATE OF PROCEDURE:

 

PREOPERATIVE DIAGNOSES:

1. Menometrorrhagia.

2. Anemia.

3. Fibroid uterus.

4. Stress urinary incontinence.

 

POSTOPERATIVE DIAGNOSES:

1. Menometrorrhagia.

2. Anemia.

3. Fibroid uterus.

4. Stress urinary incontinence.

 

OPERATIONS PERFORMED:

Total laparoscopic hysterectomy, laparoscopic bilateral salpingo-oophorectomy,

Solyx TVT, and cystoscopy.

 

ASSISTANT:

ANNABEL Maya

 

ANESTHESIA:

General endotracheal intubation, Alfredo Henderson and Dr. Barth.

 

ESTIMATED BLOOD LOSS:

For the entire procedure is 250 mL.

 

COMPLICATIONS:

None.

 

FINDINGS:

Uterus about 12 week size.  Both ovaries are essentially normal.

 

INDICATION FOR SURGERY:

[place]berry referred to the admit note.

 

PROCEDURE IN DETAIL:

The patient was brought to the OR, properly identified.  After adequate level of

general anesthesia, the patient placed in the lithotomy position with an access

to the abdomen and the vagina.  Rothman catheter was placed in the bladder for

drainage and then the colpotomizer manipulator was placed in the uterus for

manipulation and appropriate balloon was inflated.  Then, the operation shifted

abdominally.  Stab wound done beneath the umbilicus.  The Veress needle was

placed in the peritoneal cavity and that cavity insufflated with 3.5 liters of

carbon dioxide and then utilizing the Visiport technique, a 5-mm trocar is

entered infraumbilically without any problem.  Then, the patient was placed in

steep Trendelenburg and 10-12 trocar placed in the left iliac fossa and 5 mm

trocar in the right iliac fossa and the inspection of the pelvis revealed the

above-mentioned dictated finding.  The operation started by identifying the

landmark of the pelvis and using the Ace Harmonic scapula, the superior pedicle

coagulated and transected.  The tubes included with the specimen and the ovary

preserved from both sides and then the round ligament coagulated and transected,

and then the anterior leaf of the broad ligament dissected downward medially

pushing the bladder away from the lower uterine segment and then the surgeon

could easily feel the manipulator rings in the vagina.  Then skeletonization of

the uterine vessels and these vessel coagulated, transected at the level of the

internal rings with the ACE-7 Harmonic scapula.  Once that done, then using the

Ace Harmonic scapula circular incision in the vaginal mucosa was done detaching

the cervix from its attachment to the vagina.  The cervix and uterus and both

tubes removed vaginally and pneumoperitoneum re-established by placing vaginal

pack in the vagina and then the vaginal cuff was closed with 2-0 PDS interrupted

figure-of-eight sutures.  Once we did that, a thorough irrigation of the pelvis

shows no oozing, no bleeding.  Then the operation shifted abdominally and then

after infiltrating the vaginal area beneath the urethra with a copious amount of

normal saline, it is incised with electrocautery and dissected laterally in a

tunneling fashion making room for the Solyx TVT.  Then, the Solyx TVT placed in

place with a due amount of tension to elevate the urethrovesical angle and then

the vaginal cuff was closed with 2-0 Vicryl interlocking for hemostasis.  While

we were doing that, we asked the anesthesiologist to give the patient

fluorescein and then cystoscopy was performed.  The bladder was intact.  Both

ureteric orifices were seen with the dye coming from both of them.  Thus, the

patency of both ureters verified.  Satisfied with these findings, the procedure

ended.  The laparoscopic incision is closed in layer.  The instrument, hardware,

and count was correct.  The patient tolerated the procedure well went to

recovery room in stable general condition.

 

 

POONAM / TERRY

DD:  05/03/2018 12:17:16

DT:  05/03/2018 13:18:56

Job #:  [id number]

## 2018-05-04 LAB
CHLORIDE SERPL-SCNC: 106 MMOL/L (ref 98–107)
SODIUM SERPL-SCNC: 139 MMOL/L (ref 136–145)

## 2018-05-04 NOTE — PCM.SURGPN
- General Info


Date of Service: 05/04/18


POD#: 1


Functional Status: Reports: Pain Controlled





- Review of Systems


General: Reports: No Symptoms


HEENT: Reports: No Symptoms


Pulmonary: Reports: No Symptoms


Cardiovascular: Reports: No Symptoms


Gastrointestinal: Reports: No Symptoms


Genitourinary: Reports: No Symptoms


Musculoskeletal: Reports: No Symptoms


Skin: Reports: No Symptoms


Neurological: Reports: No Symptoms


Psychiatric: Reports: No Symptoms





- Patient Data


Vitals - Most Recent: 


 Last Vital Signs











Temp  37.6 C   05/04/18 08:00


 


Pulse  97   05/04/18 08:00


 


Resp  18   05/04/18 08:00


 


BP  144/94 H  05/04/18 08:00


 


Pulse Ox  95   05/04/18 08:00











Weight - Most Recent: 77.564 kg


I&O - Last 24 Hours: 


 Intake & Output











 05/03/18 05/04/18 05/04/18





 22:59 06:59 14:59


 


Intake Total 300 1550 


 


Output Total 0 1600 


 


Balance 300 -50 











Lab Results Last 24 Hrs: 


 Laboratory Results - last 24 hr











  05/03/18 05/04/18 05/04/18 Range/Units





  08:47 05:10 05:10 


 


WBC   10.05   (4.0-11.0)  K/uL


 


RBC   4.45   (4.30-5.90)  M/uL


 


Hgb   8.9 L   (12.0-16.0)  g/dL


 


Hct   29.2 L   (36.0-46.0)  %


 


MCV   65.6 L   (80.0-98.0)  fL


 


MCH   20.0 L   (27.0-32.0)  pg


 


MCHC   30.5 L   (31.0-37.0)  g/dL


 


RDW Std Deviation   61.5   (28.0-62.0)  fl


 


RDW Coeff of Gonsalo   27 H   (11.0-15.0)  %


 


Plt Count   188   (150-400)  K/uL


 


Neut % (Auto)   74.2   (48.0-80.0)  %


 


Lymph % (Auto)   17.7   (16.0-40.0)  %


 


Mono % (Auto)   7.9   (0.0-15.0)  %


 


Eos % (Auto)   0.0   (0.0-7.0)  %


 


Baso % (Auto)   0.2   (0.0-1.5)  %


 


Neut # (Auto)   7.5 H   (1.4-5.7)  K/uL


 


Lymph # (Auto)   1.8   (0.6-2.4)  K/uL


 


Mono # (Auto)   0.8   (0.0-0.8)  K/uL


 


Eos # (Auto)   0.0   (0.0-0.7)  K/uL


 


Baso # (Auto)   0.0   (0.0-0.1)  K/uL


 


Nucleated RBC %   0.0   /100WBC


 


Nucleated RBCs #   0   K/uL


 


Sodium    139  (136-145)  mmol/L


 


Potassium    3.7  (3.5-5.1)  mmol/L


 


Chloride    106  ()  mmol/L


 


Carbon Dioxide    24.8  (21.0-32.0)  mmol/L


 


BUN    8  (7.0-18.0)  mg/dL


 


Creatinine    0.8  (0.6-1.0)  mg/dL


 


Est Cr Clr Drug Dosing    84.09  mL/min


 


Estimated GFR (MDRD)    > 60.0  ml/min


 


Glucose    113 H  ()  mg/dL


 


Calcium    9.3  (8.5-10.1)  mg/dL


 


Blood Type  B POSITIVE    


 


Antibody Screen  NEGATIVE    











Med Orders - Current: 


 Current Medications





Acetaminophen (Tylenol)  650 mg PO Q4H PRN


   PRN Reason: Pain


   Last Admin: 05/04/18 08:53 Dose:  650 mg


Lactated Ringer's (Ringers, Lactated)  1,000 mls @ 125 mls/hr IV ASDIRECTED [facility]


   Last Admin: 05/03/18 13:19 Dose:  125 mls/hr


Ketorolac Tromethamine (Toradol)  30 mg IVPUSH Q6H PRN


   PRN Reason: Pain (severe 7-10)


   Stop: 05/08/18 12:07


Losartan Potassium (Cozaar)  100 mg PO BEDTIME [facility]


   Last Admin: 05/03/18 22:17 Dose:  100 mg


Morphine Sulfate (Morphine)  4 mg IVPUSH Q2H PRN


   PRN Reason: Pain (severe 7-10)


Ondansetron HCl (Zofran)  4 mg IVPUSH Q6H PRN


   PRN Reason: Nausea/Vomiting


   Last Admin: 05/03/18 21:32 Dose:  4 mg


Oxycodone/Acetaminophen (Percocet 325-5 Mg)  1 tab PO Q4H PRN


   PRN Reason: Pain (moderate 4-6)


Oxycodone/Acetaminophen (Percocet 325-5 Mg)  2 tab PO Q4H PRN


   PRN Reason: Pain (moderate 4-6)


   Last Admin: 05/03/18 13:32 Dose:  2 tab


Promethazine HCl (Phenergan)  25 mg IM Q6H PRN


   PRN Reason: Nausea/Vomiting


Sodium Chloride (Saline Flush)  10 ml FLUSH ASDIRECTED PRN


   PRN Reason: Keep Vein Open


Sodium Chloride (Saline Flush)  2.5 ml FLUSH ASDIRECTED PRN


   PRN Reason: Keep Vein Open





Discontinued Medications





Cefazolin Sodium (Ancef) Confirm Administered Dose 2 gm .ROUTE .STK-MED ONE


   Stop: 05/03/18 10:47


Dexamethasone (Dexamethasone) Confirm Administered Dose 20 mg .ROUTE .STK-MED 

ONE


   Stop: 05/03/18 10:34


Diphenhydramine HCl (Benadryl) Confirm Administered Dose 50 mg .ROUTE .STK-MED 

ONE


   Stop: 05/03/18 10:34


Fentanyl (Sublimaze) Confirm Administered Dose 250 mcg .ROUTE .STK-MED ONE


   Stop: 05/03/18 10:22


Fentanyl (Sublimaze) Confirm Administered Dose 100 mcg .ROUTE .STK-MED ONE


   Stop: 05/03/18 12:08


Fentanyl (Sublimaze)  50 mcg IVPUSH Q5M PRN


   PRN Reason: Pain


   Last Admin: 05/03/18 12:54 Dose:  50 mcg


Fluorescein Sodium (Ak-Fluor) Confirm Administered Dose 5 ml .ROUTE .STK-MED ONE


   Stop: 05/03/18 10:00


Glycopyrrolate (Robinul) Confirm Administered Dose 0.2 mg .ROUTE .STK-MED ONE


   Stop: 05/03/18 10:22


Cefazolin Sodium/Dextrose 2 gm (/ Premix)  50 mls @ 100 mls/hr IV ONETIME ONE


   Stop: 05/02/18 09:11


   Last Admin: 05/03/18 15:41 Dose:  Not Given


Acetaminophen 1,000 mg/ Premix  100 mls @ 400 mls/hr IV NOW ONE


   Stop: 05/03/18 12:37


   Last Admin: 05/03/18 12:37 Dose:  400 mls/hr


Ketorolac Tromethamine (Toradol) Confirm Administered Dose 30 mg .ROUTE .STK-

MED ONE


   Stop: 05/03/18 10:22


Ketorolac Tromethamine (Toradol)  30 mg IVPUSH ONETIME ONE


   Stop: 05/03/18 12:08


   Last Admin: 05/03/18 15:42 Dose:  Not Given


Lidocaine (Xylocaine-Mpf 2%) Confirm Administered Dose 5 ml .ROUTE .STK-MED ONE


   Stop: 05/03/18 10:22


Midazolam HCl (Versed 1 Mg/Ml) Confirm Administered Dose 2 mg .ROUTE .STK-MED 

ONE


   Stop: 05/03/18 10:22


Octyl Cyanoacrylate (Dermabond Advance) Confirm Administered Dose 1 applic 

.ROUTE .STK-MED ONE


   Stop: 05/03/18 11:37


Ondansetron HCl (Zofran) Confirm Administered Dose 4 mg .ROUTE .STK-MED ONE


   Stop: 05/03/18 10:22


Propofol (Diprivan  20 Ml) Confirm Administered Dose 200 mg .ROUTE .STK-MED ONE


   Stop: 05/03/18 10:22











- Exam


Wound/Incisions: Healing Well


General: Alert, Oriented


HEENT: Pupils Equal


Neck: Supple


Lungs: Clear to Auscultation, Normal Respiratory Effort


Cardiovascular: Regular Rate, Regular Rhythm


GI/Abdominal Exam: Normal Bowel Sounds, Soft, Non-Tender, No Organomegaly, No 

Distention, No Abnormal Bruit, No Mass, Pelvis Stable


Extremities: Normal Inspection, Normal Range of Motion, Non-Tender, No Pedal 

Edema, Normal Capillary Refill


Skin: Warm, Dry, Intact


Neurological: No New Focal Deficit


Psy/Mental Status: Alert, Normal Affect, Normal Mood





- Problem List Review


Problem List Initiated/Reviewed/Updated: Yes





- My Orders


Last 24 Hours: 


 Active Orders 24 hr











 Category Date Time Status


 


 Patient Status [ADT] Routine ADT  05/03/18 12:07 Active


 


 Notify Provider Vital Signs [RC] ASDIRECTED Care  05/03/18 12:07 Active


 


 Oxygen Therapy [RC] ASDIRECTED Care  05/03/18 12:07 Active


 


 RT Incentive Spirometry [RC] Q2HWA Care  05/03/18 12:07 Active


 


 Up With Assistance [RC] PER UNIT ROUTINE Care  05/03/18 12:07 Active


 


 Up ad Kacy [RC] PER UNIT ROUTINE Care  05/03/18 12:07 Active


 


 Vital Signs [RC] PER UNIT ROUTINE Care  05/03/18 12:07 Active


 


 Regular Diet [DIET] Diet  05/03/18 Dinner Active


 


 Acetaminophen [Tylenol] Med  05/03/18 22:10 Active





 650 mg PO Q4H PRN   


 


 Acetaminophen/oxyCODONE [Percocet 325-5 MG] Med  05/03/18 12:07 Active





 1 tab PO Q4H PRN   


 


 Acetaminophen/oxyCODONE [Percocet 325-5 MG] Med  05/03/18 12:07 Active





 2 tab PO Q4H PRN   


 


 Ketorolac [Toradol] Med  05/03/18 12:07 Active





 30 mg IVPUSH Q6H PRN   


 


 Losartan [Cozaar] Med  05/03/18 22:09 Active





 100 mg PO BEDTIME   


 


 Morphine Med  05/03/18 12:07 Active





 4 mg IVPUSH Q2H PRN   


 


 Ondansetron [Zofran] Med  05/03/18 12:07 Active





 4 mg IVPUSH Q6H PRN   


 


 Promethazine [Phenergan] Med  05/03/18 12:07 Active





 25 mg IM Q6H PRN   


 


 Peripheral IV Discontinue [OM.PC] Routine Oth  05/03/18 12:07 Ordered


 


 Sequential Compression Device [OM.PC] Per Unit Routine Oth  05/03/18 12:07 

Ordered


 


 Resuscitation Status Routine Resus Stat  05/03/18 12:07 Ordered








 Medication Orders





Acetaminophen (Tylenol)  650 mg PO Q4H PRN


   PRN Reason: Pain


   Last Admin: 05/04/18 08:53  Dose: 650 mg


   Admin: 05/04/18 05:42  Dose: 650 mg


   Admin: 05/03/18 22:17  Dose: 650 mg


Lactated Ringer's (Ringers, Lactated)  1,000 mls @ 125 mls/hr IV ASDIRECTED THOR


   Last Admin: 05/03/18 13:19  Dose: 125 mls/hr


   Infusion: 05/03/18 13:19  Dose: 125 mls/hr


   Admin: 05/03/18 08:48  Dose: 125 mls/hr


Ketorolac Tromethamine (Toradol)  30 mg IVPUSH Q6H PRN


   PRN Reason: Pain (severe 7-10)


   Stop: 05/08/18 12:07


Losartan Potassium (Cozaar)  100 mg PO BEDTIME [facility]


   Last Admin: 05/03/18 22:17  Dose: 100 mg


Morphine Sulfate (Morphine)  4 mg IVPUSH Q2H PRN


   PRN Reason: Pain (severe 7-10)


Ondansetron HCl (Zofran)  4 mg IVPUSH Q6H PRN


   PRN Reason: Nausea/Vomiting


   Last Admin: 05/03/18 21:32  Dose: 4 mg


Oxycodone/Acetaminophen (Percocet 325-5 Mg)  1 tab PO Q4H PRN


   PRN Reason: Pain (moderate 4-6)


Oxycodone/Acetaminophen (Percocet 325-5 Mg)  2 tab PO Q4H PRN


   PRN Reason: Pain (moderate 4-6)


   Last Admin: 05/03/18 13:32  Dose: 2 tab


Promethazine HCl (Phenergan)  25 mg IM Q6H PRN


   PRN Reason: Nausea/Vomiting


Sodium Chloride (Saline Flush)  10 ml FLUSH ASDIRECTED PRN


   PRN Reason: Keep Vein Open


Sodium Chloride (Saline Flush)  2.5 ml FLUSH ASDIRECTED PRN


   PRN Reason: Keep Vein Open











- Assessment


Assessment           (Free Text/Narrative):: 





Status post TL H and TVT for bleeding and strokes urinary incontinence patient 

postoperative day she is doing well she is afebrile lab work within normal 

limit there is no vaginal bleeding the patient is voiding without any problem 

on regular diet and passing gas.





- Plan


Plan                        (Free Text/Narrative):: 





The patient to be discharged today the postvasectomy instruction is given to 

the patient prescription of Fiorinal 50 mg by mouth is given to her for 

postoperative pain the patient have an appointment for follow-up in 10 day

## 2018-05-04 NOTE — [id number].[id number]
**Discharge Summary





- Discharge Data


Discharge Date: 05/04/18


Discharge Disposition: Home, Self-Care 01


Condition: Good





- Patient Summary/Data


Operative Procedure(s) Performed: TLH,Leonides salpengectomy, Solyx tvt and 

cystoscopy.





- Patient Instructions


Diet: Usual Diet as Tolerated


Driving: Do Not Drive


Showering/Bathing: May Shower


Wound/Incision Care: Keep Operative Site/Wound Site Clean and Dry


Notify Provider of: Fever, Increased Pain, Nausea and/or Vomiting





- Discharge Plan


Home Medications: 


 Home Meds





Losartan [Cozaar] 100 mg PO BEDTIME 04/20/18 [History]


Acetaminophen [Tylenol] 650 mg PO Q4H PRN  tablet 04/21/18 [Rx]


Amoxicillin [IJD: Amoxicillin] 1,000 mg PO Q12HR 14 Days #56 capsule 04/21/18 [

Rx]


Clarithromycin [Biaxin] 500 mg PO BID 14 Days #28 tablet 04/21/18 [Rx]


Ferrous Sulfate 325 mg PO TIDMEALS 60 Days #180 tablet 04/21/18 [Rx]


Pantoprazole [ProTONIX***] 40 mg PO BIDAC 30 Days #60 tab.cr 04/21/18 [Rx]


Sucralfate [Carafate] 1 gm PO Q6H 30 Days #2 cup 04/21/18 [Rx]











- General Info


Date of Service: 05/04/18


Functional Status: Reports: Pain Controlled





- Review of Systems


General: Reports: No Symptoms


HEENT: Reports: No Symptoms


Pulmonary: Reports: No Symptoms


Cardiovascular: Reports: No Symptoms


Gastrointestinal: Reports: No Symptoms


Genitourinary: Reports: No Symptoms


Musculoskeletal: Reports: No Symptoms


Skin: Reports: No Symptoms


Neurological: Reports: No Symptoms


Psychiatric: Reports: No Symptoms





- Patient Data


Vitals - Most Recent: 


 Last Vital Signs











Temp  37.6 C   05/04/18 08:00


 


Pulse  97   05/04/18 08:00


 


Resp  18   05/04/18 08:00


 


BP  144/94 H  05/04/18 08:00


 


Pulse Ox  95   05/04/18 08:00











Weight - Most Recent: 77.564 kg


I&O - Last 24 hours: 


 Intake & Output











 05/03/18 05/04/18 05/04/18





 22:59 06:59 14:59


 


Intake Total 300 1550 


 


Output Total 0 1600 


 


Balance 300 -50 











Lab Results - Last 24 hrs: 


 Laboratory Results - last 24 hr











  05/03/18 05/04/18 05/04/18 Range/Units





  08:47 05:10 05:10 


 


WBC   10.05   (4.0-11.0)  K/uL


 


RBC   4.45   (4.30-5.90)  M/uL


 


Hgb   8.9 L   (12.0-16.0)  g/dL


 


Hct   29.2 L   (36.0-46.0)  %


 


MCV   65.6 L   (80.0-98.0)  fL


 


MCH   20.0 L   (27.0-32.0)  pg


 


MCHC   30.5 L   (31.0-37.0)  g/dL


 


RDW Std Deviation   61.5   (28.0-62.0)  fl


 


RDW Coeff of Gonsalo   27 H   (11.0-15.0)  %


 


Plt Count   188   (150-400)  K/uL


 


Neut % (Auto)   74.2   (48.0-80.0)  %


 


Lymph % (Auto)   17.7   (16.0-40.0)  %


 


Mono % (Auto)   7.9   (0.0-15.0)  %


 


Eos % (Auto)   0.0   (0.0-7.0)  %


 


Baso % (Auto)   0.2   (0.0-1.5)  %


 


Neut # (Auto)   7.5 H   (1.4-5.7)  K/uL


 


Lymph # (Auto)   1.8   (0.6-2.4)  K/uL


 


Mono # (Auto)   0.8   (0.0-0.8)  K/uL


 


Eos # (Auto)   0.0   (0.0-0.7)  K/uL


 


Baso # (Auto)   0.0   (0.0-0.1)  K/uL


 


Nucleated RBC %   0.0   /100WBC


 


Nucleated RBCs #   0   K/uL


 


Sodium    139  (136-145)  mmol/L


 


Potassium    3.7  (3.5-5.1)  mmol/L


 


Chloride    106  ()  mmol/L


 


Carbon Dioxide    24.8  (21.0-32.0)  mmol/L


 


BUN    8  (7.0-18.0)  mg/dL


 


Creatinine    0.8  (0.6-1.0)  mg/dL


 


Est Cr Clr Drug Dosing    84.09  mL/min


 


Estimated GFR (MDRD)    > 60.0  ml/min


 


Glucose    113 H  ()  mg/dL


 


Calcium    9.3  (8.5-10.1)  mg/dL


 


Blood Type  B POSITIVE    


 


Antibody Screen  NEGATIVE    











Med Orders - Current: 


 Current Medications





Acetaminophen (Tylenol)  650 mg PO Q4H PRN


   PRN Reason: Pain


   Last Admin: 05/04/18 08:53 Dose:  650 mg


Lactated Ringer's (Ringers, Lactated)  1,000 mls @ 125 mls/hr IV ASDIRECTED [facility]


   Last Admin: 05/03/18 13:19 Dose:  125 mls/hr


Ketorolac Tromethamine (Toradol)  30 mg IVPUSH Q6H PRN


   PRN Reason: Pain (severe 7-10)


   Stop: 05/08/18 12:07


Losartan Potassium (Cozaar)  100 mg PO BEDTIME [facility]


   Last Admin: 05/03/18 22:17 Dose:  100 mg


Morphine Sulfate (Morphine)  4 mg IVPUSH Q2H PRN


   PRN Reason: Pain (severe 7-10)


Ondansetron HCl (Zofran)  4 mg IVPUSH Q6H PRN


   PRN Reason: Nausea/Vomiting


   Last Admin: 05/03/18 21:32 Dose:  4 mg


Oxycodone/Acetaminophen (Percocet 325-5 Mg)  1 tab PO Q4H PRN


   PRN Reason: Pain (moderate 4-6)


Oxycodone/Acetaminophen (Percocet 325-5 Mg)  2 tab PO Q4H PRN


   PRN Reason: Pain (moderate 4-6)


   Last Admin: 05/03/18 13:32 Dose:  2 tab


Promethazine HCl (Phenergan)  25 mg IM Q6H PRN


   PRN Reason: Nausea/Vomiting


Sodium Chloride (Saline Flush)  10 ml FLUSH ASDIRECTED PRN


   PRN Reason: Keep Vein Open


Sodium Chloride (Saline Flush)  2.5 ml FLUSH ASDIRECTED PRN


   PRN Reason: Keep Vein Open





Discontinued Medications





Cefazolin Sodium (Ancef) Confirm Administered Dose 2 gm .ROUTE .STK-MED ONE


   Stop: 05/03/18 10:47


Dexamethasone (Dexamethasone) Confirm Administered Dose 20 mg .ROUTE .STK-MED 

ONE


   Stop: 05/03/18 10:34


Diphenhydramine HCl (Benadryl) Confirm Administered Dose 50 mg .ROUTE .STK-MED 

ONE


   Stop: 05/03/18 10:34


Fentanyl (Sublimaze) Confirm Administered Dose 250 mcg .ROUTE .STK-MED ONE


   Stop: 05/03/18 10:22


Fentanyl (Sublimaze) Confirm Administered Dose 100 mcg .ROUTE .STK-MED ONE


   Stop: 05/03/18 12:08


Fentanyl (Sublimaze)  50 mcg IVPUSH Q5M PRN


   PRN Reason: Pain


   Last Admin: 05/03/18 12:54 Dose:  50 mcg


Fluorescein Sodium (Ak-Fluor) Confirm Administered Dose 5 ml .ROUTE .STK-MED ONE


   Stop: 05/03/18 10:00


Glycopyrrolate (Robinul) Confirm Administered Dose 0.2 mg .ROUTE .STK-MED ONE


   Stop: 05/03/18 10:22


Cefazolin Sodium/Dextrose 2 gm (/ Premix)  50 mls @ 100 mls/hr IV ONETIME ONE


   Stop: 05/02/18 09:11


   Last Admin: 05/03/18 15:41 Dose:  Not Given


Acetaminophen 1,000 mg/ Premix  100 mls @ 400 mls/hr IV NOW ONE


   Stop: 05/03/18 12:37


   Last Admin: 05/03/18 12:37 Dose:  400 mls/hr


Ketorolac Tromethamine (Toradol) Confirm Administered Dose 30 mg .ROUTE .STK-

MED ONE


   Stop: 05/03/18 10:22


Ketorolac Tromethamine (Toradol)  30 mg IVPUSH ONETIME ONE


   Stop: 05/03/18 12:08


   Last Admin: 05/03/18 15:42 Dose:  Not Given


Lidocaine (Xylocaine-Mpf 2%) Confirm Administered Dose 5 ml .ROUTE .STK-MED ONE


   Stop: 05/03/18 10:22


Midazolam HCl (Versed 1 Mg/Ml) Confirm Administered Dose 2 mg .ROUTE .STK-MED 

ONE


   Stop: 05/03/18 10:22


Octyl Cyanoacrylate (Dermabond Advance) Confirm Administered Dose 1 applic 

.ROUTE .STK-MED ONE


   Stop: 05/03/18 11:37


Ondansetron HCl (Zofran) Confirm Administered Dose 4 mg .ROUTE .STK-MED ONE


   Stop: 05/03/18 10:22


Propofol (Diprivan  20 Ml) Confirm Administered Dose 200 mg .ROUTE .STK-MED ONE


   Stop: 05/03/18 10:22











- Exam


General: Reports: Alert, Oriented


HEENT: Reports: Pupils Equal, Pupils Reactive, EOMI, Mucous Membr. Moist/Pink


Neck: Reports: Supple


Lungs: Reports: Clear to Auscultation, Normal Respiratory Effort


Cardiovascular: Reports: Regular Rate, Regular Rhythm


GI/Abdominal Exam: Normal Bowel Sounds, Soft, Non-Tender, No Organomegaly, No 

Distention, No Abnormal Bruit, No Mass, Pelvis Stable


 (Female) Exam: Normal External Exam, Normal Speculum Exam, Normal Bimanual 

Exam


Rectal (Female) Exam: Normal Exam, Normal Rectal Tone


Back Exam: Reports: Normal Inspection, Full Range of Motion


Extremities: Normal Inspection, Normal Range of Motion, Non-Tender, No Pedal 

Edema, Normal Capillary Refill


Skin: Reports: Warm, Dry, Intact


Wound/Incisions: Reports: Healing Well


Neurological: Reports: No New Focal Deficit


Psy/Mental Status: Reports: Alert, Normal Affect, Normal Mood

## 2018-05-12 ENCOUNTER — HOSPITAL ENCOUNTER (EMERGENCY)
Dept: HOSPITAL 56 - MW.ED | Age: 43
Discharge: HOME | End: 2018-05-12
Payer: OTHER GOVERNMENT

## 2018-05-12 DIAGNOSIS — R39.11: Primary | ICD-10-CM

## 2018-05-12 DIAGNOSIS — K21.9: ICD-10-CM

## 2018-05-12 DIAGNOSIS — Z87.442: ICD-10-CM

## 2018-05-12 DIAGNOSIS — Z91.040: ICD-10-CM

## 2018-05-12 DIAGNOSIS — Z88.8: ICD-10-CM

## 2018-05-12 DIAGNOSIS — I10: ICD-10-CM

## 2018-05-12 DIAGNOSIS — Z79.899: ICD-10-CM

## 2018-05-12 DIAGNOSIS — F41.9: ICD-10-CM

## 2018-05-12 DIAGNOSIS — D64.9: ICD-10-CM

## 2018-05-12 DIAGNOSIS — Z88.5: ICD-10-CM

## 2018-05-12 PROCEDURE — 81001 URINALYSIS AUTO W/SCOPE: CPT

## 2018-05-12 PROCEDURE — 99284 EMERGENCY DEPT VISIT MOD MDM: CPT

## 2018-05-12 PROCEDURE — 96372 THER/PROPH/DIAG INJ SC/IM: CPT

## 2018-05-12 NOTE — EDM.PDOC
ED HPI GENERAL MEDICAL PROBLEM





- General


Chief Complaint: Genitourinary Problem


Stated Complaint: DIFFICULTY URINATING


Time Seen by Provider: 05/12/18 09:41


Source of Information: Reports: Patient


History Limitations: Reports: No Limitations





- History of Present Illness


INITIAL COMMENTS - FREE TEXT/NARRATIVE: 


History of present illness:


[]Patient status post hysterectomy on May 3 by , she has been unable to 

urinate since last night and feels pressure in her bladder that is painful. She 

denies any fevers or chills and states that she has not been drinking much 

fluid because she is very worried about filling her bladder. Patient has not 

had any vomiting or diarrhea or other complaints.





Review of systems: 


As per history of present illness and below otherwise all systems reviewed and 

negative.





Past medical history: 


As per history of present illness and as reviewed below otherwise 

noncontributory.





Surgical history: 


As per history of present illness and as reviewed below otherwise 

noncontributory.





Social history: 


No reported history of drug or alcohol abuse.





Family history: 


As per history of present illness and as reviewed below otherwise 

noncontributory.





Physical exam:


General: Well developed, well nourished ,very anxious and angry stating that it 

is secondary to pain


HEENT: Atraumatic, normocephalic, pupils reactive, negative for conjunctival 

pallor or scleral icterus, mucous membranes moist, throat clear, neck supple, 

nontender, trachea midline.


Lungs: Clear to auscultation, breath sounds equal bilaterally, chest nontender.


Heart: S1S2, regular, negative for clicks, rubs, or JVD.


Abdomen: Soft, distended tender in suprapubic area without rebound or guarding. 

Negative for masses or hepatosplenomegaly. Negative for costovertebral 

tenderness.


Pelvis: Stable nontender.


Genitourinary: Deferred.


Rectal: Deferred.


Extremities: Atraumatic, negative for cords or calf pain. Neurovascular 

unremarkable.


Neuro: Awake, alert, oriented. Cranial nerves II through XII unremarkable. 

Cerebellum unremarkable. Motor and sensory unremarkable throughout. Exam 

nonfocal.





Diagnostics:


[]Bladder scan showed and 360 mils of urine, UA negative





Therapeutics:


[][place] placed





Impression: 


[]Urinary hesitancy





Plan: Increase fluids follow-up with Dr. Gandara return if symptoms worsen or 

change





Definitive disposition and diagnosis as appropriate pending reevaluation and 

review of above.





  ** lower abdominal


Pain Score (Numeric/FACES): 7





- Related Data


 Allergies











Allergy/AdvReac Type Severity Reaction Status Date / Time


 


adhesive Allergy  Hives Verified 05/12/18 09:49


 


latex Allergy  Hives Verified 05/12/18 09:49


 


morphine Allergy  Anaphylactic Verified 05/12/18 09:49





   Shock  


 


povidone-iodine Allergy  Hives Verified 05/12/18 09:49





[From Betadine]     


 


soap [From Betadine] Allergy  Hives Verified 05/12/18 09:49











Home Meds: 


 Home Meds





Losartan [Cozaar] 100 mg PO BEDTIME 04/20/18 [History]


Acetaminophen [Tylenol] 650 mg PO Q4H PRN  tablet 04/21/18 [Rx]


Amoxicillin [IJD: Amoxicillin] 1,000 mg PO Q12HR 14 Days #56 capsule 04/21/18 [

Rx]


Clarithromycin [Biaxin] 500 mg PO BID 14 Days #28 tablet 04/21/18 [Rx]


Ferrous Sulfate 325 mg PO TIDMEALS 60 Days #180 tablet 04/21/18 [Rx]


Pantoprazole [ProTONIX***] 40 mg PO BIDAC 30 Days #60 tab.cr 04/21/18 [Rx]


Sucralfate [Carafate] 1 gm PO Q6H 30 Days #2 cup 04/21/18 [Rx]











Past Medical History


HEENT History: Reports: Other (See Below)


Other HEENT History: wears glasses, has top and bottom dentures


Cardiovascular History: Reports: Hypertension


Respiratory History: Reports: None


Gastrointestinal History: Reports: GERD


Genitourinary History: Reports: Renal Calculus


OB/GYN History: Reports: Pregnancy


Musculoskeletal History: Reports: None


Neurological History: Reports: None


Psychiatric History: Reports: Anxiety


Endocrine/Metabolic History: Reports: None


Hematologic History: Reports: Anemia, Blood Transfusion(s)


Immunologic History: Reports: None


Oncologic (Cancer) History: Reports: None


Dermatologic History: Reports: None





- Infectious Disease History


Infectious Disease History: Reports: None





- Past Surgical History


Head Surgeries/Procedures: Reports: None


GI Surgical History: Reports: Bariatric Procedure


Female  Surgical History: Reports: Tubal Ligation


Neurological Surgical History: Reports: None





Social & Family History





- Family History


Family Medical History: Noncontributory


Cardiac: Reports: Other (See Below)


Other Cardiac Family History: Heart Disease


Endocrine/Metabolic: Reports: Diabetes, type II


Oncologic: Reports: Other (See Below)


Other Oncologic Family History: Cancer





- Caffeine Use


Caffeine Use: Reports: None





ED ROS GENERAL





- Review of Systems


Review Of Systems: See Below (See history of present illness)





ED EXAM, RENAL/





- Physical Exam


Exam: See Below





Course





- Vital Signs


Last Recorded V/S: 


 Last Vital Signs











Temp  97.2 F   05/12/18 09:52


 


Pulse  111 H  05/12/18 09:52


 


Resp  18   05/12/18 09:52


 


BP  144/102 H  05/12/18 09:52


 


Pulse Ox  97   05/12/18 09:52














- Orders/Labs/Meds


Orders: 


 Active Orders 24 hr











 Category Date Time Status


 


 Bladder Scan [RC] ONETIME Care  05/12/18 09:54 Active


 


 UA W/MICROSCOPIC [URIN] Stat Lab  05/12/18 10:20 Ordered











Labs: 


 Laboratory Tests











  05/12/18 Range/Units





  10:20 


 


Urine Color  YELLOW  


 


Urine Appearance  CLEAR  


 


Urine pH  6.0  (5.0-8.0)  


 


Ur Specific Gravity  1.010  (1.001-1.035)  


 


Urine Protein  NEGATIVE  (NEGATIVE)  mg/dL


 


Urine Glucose (UA)  NEGATIVE  (NEGATIVE)  mg/dL


 


Urine Ketones  NEGATIVE  (NEGATIVE)  mg/dL


 


Urine Occult Blood  NEGATIVE  (NEGATIVE)  


 


Urine Nitrite  NEGATIVE  (NEGATIVE)  


 


Urine Bilirubin  NEGATIVE  (NEGATIVE)  


 


Urine Urobilinogen  0.2  (<2.0)  EU/dL


 


Ur Leukocyte Esterase  NEGATIVE  (NEGATIVE)  


 


Urine RBC  NONE SEEN  (0-2/HPF)  


 


Urine WBC  0-1  (0-5/HPF)  


 


Ur Epithelial Cells  FEW  (NONE-FEW)  


 


Urine Bacteria  FEW  (NEGATIVE)  











Meds: 


Medications














Discontinued Medications














Generic Name Dose Route Start Last Admin





  Trade Name Freq  PRN Reason Stop Dose Admin


 


Ketorolac Tromethamine  60 mg  05/12/18 09:55  05/12/18 10:27





  Toradol  IM  05/12/18 09:56  60 mg





  ONETIME ONE   Administration





     





     





     





     


 


Lorazepam  1 mg  05/12/18 09:55  05/12/18 10:30





  Ativan  PO  05/12/18 09:56  Not Given





  ONETIME ONE   





     





     





     





     














Departure





- Departure


Time of Disposition: 10:45


Disposition: Home, Self-Care 01


Condition: Good


Clinical Impression: 


 Urinary hesitancy








- Discharge Information


Referrals: 


PCP,Unknown [Primary Care Provider] - 


Forms:  ED Department Discharge


Additional Instructions: 


The following information is given to patients seen in the emergency department 

who are being discharged to home. This information is to outline your options 

for follow-up care. We provide all patients seen in our emergency department 

with a follow-up referral.





The need for follow-up, as well as the timing and circumstances, are variable 

depending upon the specifics of your emergency department visit.





If you don't have a primary care physician on staff, we will provide you with a 

referral. We always advise you to contact your personal physician following an 

emergency department visit to inform them of the circumstance of the visit and 

for follow-up with them and/or the need for any referrals to a consulting 

specialist.





The emergency department will also refer you to a specialist when appropriate. 

This referral assures that you have the opportunity for follow-up care with a 

specialist. All of these measure are taken in an effort to provide you with 

optimal care, which includes your follow-up.





Under all circumstances we always encourage you to contact your private 

physician who remains a resource for coordinating your care. When calling for 

follow-up care, please make the office aware that this follow-up is from your 

recent emergency room visit. If for any reason you are refused follow-up, 

please contact the [facility] Emergency 

Department at [phone] and asked to speak to the emergency department 

charge nurse.





Increase fluids until urine is clear follow-up with primary care or Dr. Holder, 

return to ER if symptoms worsen or change





- My Orders


Last 24 Hours: 


My Active Orders





05/12/18 09:54


Bladder Scan [RC] ONETIME 





05/12/18 10:20


UA W/MICROSCOPIC [URIN] Stat 














- Assessment/Plan


Last 24 Hours: 


My Active Orders





05/12/18 09:54


Bladder Scan [RC] ONETIME 





05/12/18 10:20


UA W/MICROSCOPIC [URIN] Stat

## 2018-05-17 ENCOUNTER — HOSPITAL ENCOUNTER (EMERGENCY)
Dept: HOSPITAL 56 - MW.ED | Age: 43
Discharge: HOME | End: 2018-05-17
Payer: COMMERCIAL

## 2018-05-17 DIAGNOSIS — I10: ICD-10-CM

## 2018-05-17 DIAGNOSIS — N20.0: Primary | ICD-10-CM

## 2018-05-17 DIAGNOSIS — Z88.5: ICD-10-CM

## 2018-05-17 DIAGNOSIS — Z91.040: ICD-10-CM

## 2018-05-17 DIAGNOSIS — Z79.899: ICD-10-CM

## 2018-05-17 DIAGNOSIS — Z91.048: ICD-10-CM

## 2018-05-17 DIAGNOSIS — Z88.8: ICD-10-CM

## 2018-05-17 LAB
CHLORIDE SERPL-SCNC: 104 MMOL/L (ref 98–107)
SODIUM SERPL-SCNC: 137 MMOL/L (ref 136–145)

## 2018-05-17 PROCEDURE — 87086 URINE CULTURE/COLONY COUNT: CPT

## 2018-05-17 PROCEDURE — 99284 EMERGENCY DEPT VISIT MOD MDM: CPT

## 2018-05-17 PROCEDURE — 81001 URINALYSIS AUTO W/SCOPE: CPT

## 2018-05-17 PROCEDURE — 74176 CT ABD & PELVIS W/O CONTRAST: CPT

## 2018-05-17 PROCEDURE — 81025 URINE PREGNANCY TEST: CPT

## 2018-05-17 PROCEDURE — 85025 COMPLETE CBC W/AUTO DIFF WBC: CPT

## 2018-05-17 PROCEDURE — 36415 COLL VENOUS BLD VENIPUNCTURE: CPT

## 2018-05-17 PROCEDURE — 96360 HYDRATION IV INFUSION INIT: CPT

## 2018-05-17 PROCEDURE — 80053 COMPREHEN METABOLIC PANEL: CPT

## 2018-05-17 NOTE — EDM.PDOC
ED HPI GENERAL MEDICAL PROBLEM





- General


Chief Complaint: Flank Pain


Stated Complaint: ABDOMINAL PAIN


Time Seen by Provider: 05/17/18 16:00


Source of Information: Reports: Patient


History Limitations: Reports: No Limitations





- History of Present Illness


INITIAL COMMENTS - FREE TEXT/NARRATIVE: 





HISTORY AND PHYSICAL:





History of present illness:


42-year-old female presenting to emergency department with chief complaint of 

bilateral flank pain with past medical history of nephrolithiasis and 

hypertension.





Patient states that yesterday she began have some bilateral flank pain and this 

morning while urinating she thinks that she passed a small stone. She is had 

continued bilateral flank pain right worse than left 6 out of 10 at this moment 

with some radiation anteriorly. Patient states that she has not had any dysuria

, hematuria, or signs of infection including but not limited to fever, chills, 

nausea, vomiting, diarrhea. She does report a history of nephrolithiasis 

approximate 6-7 years ago at which time she passed multiple stones. She has not 

had subsequent episodes since. She does report drinking plenty of water and 

keeping hydrated. She does report a history of bariatric surgery and recently 

had a history by Dr. Holder here in [place]. Soon after hysterectomy she was 

seen in our ER for urinary hesitancy. She currently denies any chest pain, 

palpitations, shortness breath, syncopal episodes, focal neurologic deficits.








Review of systems: 


As per history of present illness and below otherwise all systems reviewed and 

negative.





Past medical history: 


As per history of present illness and as reviewed below otherwise 

noncontributory.





Surgical history: 


As per history of present illness and as reviewed below otherwise 

noncontributory.





Social history: 


No reported history of drug or alcohol abuse.





Family history: 


As per history of present illness and as reviewed below otherwise 

noncontributory.





Physical exam:


HEENT: Atraumatic, normocephalic, pupils reactive, negative for conjunctival 

pallor or scleral icterus, mucous membranes moist, throat clear, neck supple, 

nontender, trachea midline.


Lungs: Clear to auscultation, breath sounds equal bilaterally, chest nontender.


Heart: S1S2, regular, negative for clicks, rubs, or JVD.


Abdomen: Soft, nondistended, nontender. Negative for masses or 

hepatosplenomegaly.  bilateral costovertebral tenderness.


Pelvis: Stable nontender.


Genitourinary: Deferred.


Rectal: Deferred.


Extremities: Atraumatic, negative for cords or calf pain. Neurovascular 

unremarkable.


Neuro: Awake, alert, oriented. Cranial nerves II through XII unremarkable. 

Cerebellum unremarkable. Motor and sensory unremarkable throughout. Exam 

nonfocal.





Diagnostics:


[CBC, CMP, UA/UC, hCG, CT abdomen pelvis]





Therapeutics:


[1 L LR, Tamsulosin, Norco #12, Zofran





Impression: 


[Nonobstructing nephrolithiasis





Plan:


Nonobstructing nephrolithiasis: CT results showed bilateral nonobstructing 

nephrolithiasis. Renal stones were relatively small and not worrisome for 

obstruction. This was communicated to the patient. She has no signs or symptoms 

of infection or obstruction. CBC, CMP, and UA were unremarkable. Patient was 

advised to collect stones and watch for any signs of infection including but 

not limited to fever, chills, malaise, hematuria, increased pain. She is going 

to follow-up with her PCP Dr. Trimble.  In addition I suggested the patient see a 

urologist for further examination of her nephrolithiasis and give any collected 

stones to her primary care provider or urologist. I also gave patient a 

prescription for tamsulosin 0.4 mg by mouth every day 4 weeks Norco 5-325 #12 

for pain and Zofran 4 mg by mouth 6 hours any future nausea. Patient was 

advised to follow-up as scheduled with her PCP and return to emergency 

department if she had any new or worsening symptoms. Patient was given a total 

of 3 for stone collection.





  ** Right Flank


Pain Score (Numeric/FACES): 6





- Related Data


 Allergies











Allergy/AdvReac Type Severity Reaction Status Date / Time


 


adhesive Allergy  Hives Verified 05/17/18 15:57


 


latex Allergy  Hives Verified 05/17/18 15:57


 


morphine Allergy  Anaphylactic Verified 05/17/18 15:57





   Shock  


 


povidone-iodine Allergy  Hives Verified 05/17/18 15:57





[From Betadine]     


 


soap [From Betadine] Allergy  Hives Verified 05/17/18 15:57











Home Meds: 


 Home Meds





Losartan [Cozaar] 100 mg PO BEDTIME 04/20/18 [History]


Acetaminophen [Tylenol] 650 mg PO Q4H PRN  tablet 04/21/18 [Rx]


Ferrous Sulfate 325 mg PO TIDMEALS 60 Days #180 tablet 04/21/18 [Rx]


Pantoprazole [ProTONIX***] 40 mg PO BIDAC 30 Days #60 tab.cr 04/21/18 [Rx]











Past Medical History


HEENT History: Reports: Other (See Below)


Other HEENT History: wears glasses, has top and bottom dentures


Cardiovascular History: Reports: Hypertension


Respiratory History: Reports: None


Gastrointestinal History: Reports: GERD


Genitourinary History: Reports: Renal Calculus


OB/GYN History: Reports: Pregnancy


Musculoskeletal History: Reports: None


Neurological History: Reports: None


Psychiatric History: Reports: Anxiety


Endocrine/Metabolic History: Reports: None


Hematologic History: Reports: Anemia, Blood Transfusion(s)


Immunologic History: Reports: None


Oncologic (Cancer) History: Reports: None


Dermatologic History: Reports: None





- Infectious Disease History


Infectious Disease History: Reports: None





- Past Surgical History


Head Surgeries/Procedures: Reports: None


GI Surgical History: Reports: Bariatric Procedure


Female  Surgical History: Reports: Tubal Ligation


Neurological Surgical History: Reports: None





Social & Family History





- Family History


Family Medical History: Noncontributory


Cardiac: Reports: Other (See Below)


Other Cardiac Family History: Heart Disease


Endocrine/Metabolic: Reports: Diabetes, type II


Oncologic: Reports: Other (See Below)


Other Oncologic Family History: Cancer





- Tobacco Use


Smoking Status *Q: Never Smoker





- Caffeine Use


Caffeine Use: Reports: Soda





- Recreational Drug Use


Recreational Drug Use: No





ED ROS GENERAL





- Review of Systems


Review Of Systems: See Below





ED EXAM, GENERAL





- Physical Exam


Exam: See Below





Course





- Vital Signs


Last Recorded V/S: 


 Last Vital Signs











Temp  98.5 F   05/17/18 17:35


 


Pulse  75   05/17/18 17:35


 


Resp  16   05/17/18 17:35


 


BP  162/98 H  05/17/18 17:35


 


Pulse Ox  99   05/17/18 17:35














- Orders/Labs/Meds


Orders: 


 Active Orders 24 hr











 Category Date Time Status


 


 Abdomen Pelvis wo Cont [CT] Stat Exams  05/17/18 16:14 Taken


 


 CULTURE URINE [RM] Stat Lab  05/17/18 16:37 Received


 


 HCG QUALITATIVE,URINE [URCHEM] Stat Lab  05/17/18 16:37 Ordered


 


 UA W/MICROSCOPIC [URIN] Stat Lab  05/17/18 16:37 Ordered











Labs: 


 Laboratory Tests











  05/17/18 05/17/18 05/17/18 Range/Units





  16:11 16:11 16:37 


 


WBC  10.75    (4.0-11.0)  K/uL


 


RBC  4.88    (4.30-5.90)  M/uL


 


Hgb  10.0 L    (12.0-16.0)  g/dL


 


Hct  32.8 L    (36.0-46.0)  %


 


MCV  67.2 L    (80.0-98.0)  fL


 


MCH  20.5 L    (27.0-32.0)  pg


 


MCHC  30.5 L    (31.0-37.0)  g/dL


 


RDW Std Deviation  62.8 H    (28.0-62.0)  fl


 


RDW Coeff of Gonsalo  26 H    (11.0-15.0)  %


 


Plt Count  264    (150-400)  K/uL


 


Neut % (Auto)  61.7    (48.0-80.0)  %


 


Lymph % (Auto)  27.7    (16.0-40.0)  %


 


Mono % (Auto)  7.0    (0.0-15.0)  %


 


Eos % (Auto)  3.0    (0.0-7.0)  %


 


Baso % (Auto)  0.6    (0.0-1.5)  %


 


Neut # (Auto)  6.6 H    (1.4-5.7)  K/uL


 


Lymph # (Auto)  3.0 H    (0.6-2.4)  K/uL


 


Mono # (Auto)  0.8    (0.0-0.8)  K/uL


 


Eos # (Auto)  0.3    (0.0-0.7)  K/uL


 


Baso # (Auto)  0.1    (0.0-0.1)  K/uL


 


Nucleated RBC %  0.0    /100WBC


 


Nucleated RBCs #  0    K/uL


 


Sodium   137   (136-145)  mmol/L


 


Potassium   4.4   (3.5-5.1)  mmol/L


 


Chloride   104   ()  mmol/L


 


Carbon Dioxide   25.2   (21.0-32.0)  mmol/L


 


BUN   9   (7.0-18.0)  mg/dL


 


Creatinine   0.8   (0.6-1.0)  mg/dL


 


Est Cr Clr Drug Dosing   82.43   mL/min


 


Estimated GFR (MDRD)   > 60.0   ml/min


 


Glucose   93   ()  mg/dL


 


Calcium   9.1   (8.5-10.1)  mg/dL


 


Total Bilirubin   0.3   (0.2-1.0)  mg/dL


 


AST   18   (15-37)  IU/L


 


ALT   13 L   (14-63)  IU/L


 


Alkaline Phosphatase   109   ()  U/L


 


Total Protein   7.9   (6.4-8.2)  g/dL


 


Albumin   3.5   (3.4-5.0)  g/dL


 


Globulin   4.4 H   (2.0-3.5)  g/dL


 


Albumin/Globulin Ratio   0.8 L   (1.3-2.8)  


 


Urine Color    YELLOW  


 


Urine Appearance    SLT CLOUDY  


 


Urine pH    6.0  (5.0-8.0)  


 


Ur Specific Gravity    >= 1.030  (1.001-1.035)  


 


Urine Protein    NEGATIVE  (NEGATIVE)  mg/dL


 


Urine Glucose (UA)    NEGATIVE  (NEGATIVE)  mg/dL


 


Urine Ketones    NEGATIVE  (NEGATIVE)  mg/dL


 


Urine Occult Blood    SMALL H  (NEGATIVE)  


 


Urine Nitrite    NEGATIVE  (NEGATIVE)  


 


Urine Bilirubin    NEGATIVE  (NEGATIVE)  


 


Urine Urobilinogen    0.2  (<2.0)  EU/dL


 


Ur Leukocyte Esterase    SMALL  (NEGATIVE)  


 


Urine RBC    1-3  (0-2/HPF)  


 


Urine WBC    8-10  (0-5/HPF)  


 


Ur Epithelial Cells    MANY  (NONE-FEW)  


 


Urine Bacteria    FEW  (NEGATIVE)  


 


Urine Mucus    MODERATE  (NONE-MOD)  


 


Urine HCG, Qual     (NEGATIVE)  














  05/17/18 Range/Units





  16:37 


 


WBC   (4.0-11.0)  K/uL


 


RBC   (4.30-5.90)  M/uL


 


Hgb   (12.0-16.0)  g/dL


 


Hct   (36.0-46.0)  %


 


MCV   (80.0-98.0)  fL


 


MCH   (27.0-32.0)  pg


 


MCHC   (31.0-37.0)  g/dL


 


RDW Std Deviation   (28.0-62.0)  fl


 


RDW Coeff of Gonsalo   (11.0-15.0)  %


 


Plt Count   (150-400)  K/uL


 


Neut % (Auto)   (48.0-80.0)  %


 


Lymph % (Auto)   (16.0-40.0)  %


 


Mono % (Auto)   (0.0-15.0)  %


 


Eos % (Auto)   (0.0-7.0)  %


 


Baso % (Auto)   (0.0-1.5)  %


 


Neut # (Auto)   (1.4-5.7)  K/uL


 


Lymph # (Auto)   (0.6-2.4)  K/uL


 


Mono # (Auto)   (0.0-0.8)  K/uL


 


Eos # (Auto)   (0.0-0.7)  K/uL


 


Baso # (Auto)   (0.0-0.1)  K/uL


 


Nucleated RBC %   /100WBC


 


Nucleated RBCs #   K/uL


 


Sodium   (136-145)  mmol/L


 


Potassium   (3.5-5.1)  mmol/L


 


Chloride   ()  mmol/L


 


Carbon Dioxide   (21.0-32.0)  mmol/L


 


BUN   (7.0-18.0)  mg/dL


 


Creatinine   (0.6-1.0)  mg/dL


 


Est Cr Clr Drug Dosing   mL/min


 


Estimated GFR (MDRD)   ml/min


 


Glucose   ()  mg/dL


 


Calcium   (8.5-10.1)  mg/dL


 


Total Bilirubin   (0.2-1.0)  mg/dL


 


AST   (15-37)  IU/L


 


ALT   (14-63)  IU/L


 


Alkaline Phosphatase   ()  U/L


 


Total Protein   (6.4-8.2)  g/dL


 


Albumin   (3.4-5.0)  g/dL


 


Globulin   (2.0-3.5)  g/dL


 


Albumin/Globulin Ratio   (1.3-2.8)  


 


Urine Color   


 


Urine Appearance   


 


Urine pH   (5.0-8.0)  


 


Ur Specific Gravity   (1.001-1.035)  


 


Urine Protein   (NEGATIVE)  mg/dL


 


Urine Glucose (UA)   (NEGATIVE)  mg/dL


 


Urine Ketones   (NEGATIVE)  mg/dL


 


Urine Occult Blood   (NEGATIVE)  


 


Urine Nitrite   (NEGATIVE)  


 


Urine Bilirubin   (NEGATIVE)  


 


Urine Urobilinogen   (<2.0)  EU/dL


 


Ur Leukocyte Esterase   (NEGATIVE)  


 


Urine RBC   (0-2/HPF)  


 


Urine WBC   (0-5/HPF)  


 


Ur Epithelial Cells   (NONE-FEW)  


 


Urine Bacteria   (NEGATIVE)  


 


Urine Mucus   (NONE-MOD)  


 


Urine HCG, Qual  NEGATIVE  (NEGATIVE)  











Meds: 


Medications














Discontinued Medications














Generic Name Dose Route Start Last Admin





  Trade Name Freq  PRN Reason Stop Dose Admin


 


Lactated Ringer's  1,000 mls @ 999 mls/hr  05/17/18 16:35  05/17/18 16:39





  Ringers, Lactated  IV  05/17/18 17:35  999 mls/hr





  .BOLUS ONE   Administration





     





     





     





     














Departure





- Departure


Time of Disposition: 18:33


Disposition: Home, Self-Care 01


Condition: Good


Clinical Impression: 


 Nephrolithiasis








- Discharge Information


Referrals: 


PCP,None [Primary Care Provider] - 


Forms:  ED Department Discharge


Additional Instructions: 


My general discharge


The following information is given to patients seen in the emergency department 

who are being discharged to home. This information is to outline your options 

for follow-up care. We provide all patients seen in our emergency department 

with a follow-up referral.





The need for follow-up, as well as the timing and circumstances, are variable 

depending upon the specifics of your emergency department visit.





If you don't have a primary care physician on staff, we will provide you with a 

referral. We always advise you to contact your personal physician following an 

emergency department visit to inform them of the circumstance of the visit and 

for follow-up with them and/or the need for any referrals to a consulting 

specialist.





The emergency department will also refer you to a specialist when appropriate. 

This referral assures that you have the opportunity for follow-up care with a 

specialist. All of these measure are taken in an effort to provide you with 

optimal care, which includes your follow-up.





Under all circumstances we always encourage you to contact your private 

physician who remains a resource for coordinating your care. When calling for 

follow-up care, please make the office aware that this follow-up is from your 

recent emergency room visit. If for any reason you are refused follow-up, 

please contact the [facility] Emergency 

Department at [phone] and asked to speak to the emergency department 

charge nurse.





[facility]


Primary Care


[address] [place] [address]


Phone: [phone]


Fax: [phone]





[facility]


Specialty Care - Urology


[address] [place] [address]


Phone: [phone]


Fax: [phone]








- My Orders


Last 24 Hours: 


My Active Orders





05/17/18 16:14


Abdomen Pelvis wo Cont [CT] Stat 





05/17/18 16:37


CULTURE URINE [RM] Stat 


HCG QUALITATIVE,URINE [URCHEM] Stat 


UA W/MICROSCOPIC [URIN] Stat 














- Assessment/Plan


Last 24 Hours: 


My Active Orders





05/17/18 16:14


Abdomen Pelvis wo Cont [CT] Stat 





05/17/18 16:37


CULTURE URINE [RM] Stat 


HCG QUALITATIVE,URINE [URCHEM] Stat 


UA W/MICROSCOPIC [URIN] Stat

## 2018-06-07 ENCOUNTER — HOSPITAL ENCOUNTER (OUTPATIENT)
Dept: HOSPITAL 56 - MW.SDS | Age: 43
Discharge: HOME | End: 2018-06-07
Attending: UROLOGY
Payer: COMMERCIAL

## 2018-06-07 ENCOUNTER — HOSPITAL ENCOUNTER (EMERGENCY)
Dept: HOSPITAL 56 - MW.ED | Age: 43
Discharge: HOME | End: 2018-06-07
Payer: COMMERCIAL

## 2018-06-07 DIAGNOSIS — G47.30: ICD-10-CM

## 2018-06-07 DIAGNOSIS — I10: ICD-10-CM

## 2018-06-07 DIAGNOSIS — Z88.5: ICD-10-CM

## 2018-06-07 DIAGNOSIS — Z90.710: ICD-10-CM

## 2018-06-07 DIAGNOSIS — K21.9: ICD-10-CM

## 2018-06-07 DIAGNOSIS — Z88.8: ICD-10-CM

## 2018-06-07 DIAGNOSIS — Z98.84: ICD-10-CM

## 2018-06-07 DIAGNOSIS — Z79.899: ICD-10-CM

## 2018-06-07 DIAGNOSIS — D64.9: ICD-10-CM

## 2018-06-07 DIAGNOSIS — Z91.040: ICD-10-CM

## 2018-06-07 DIAGNOSIS — G43.909: ICD-10-CM

## 2018-06-07 DIAGNOSIS — E66.9: ICD-10-CM

## 2018-06-07 DIAGNOSIS — Z91.018: ICD-10-CM

## 2018-06-07 DIAGNOSIS — Z91.09: ICD-10-CM

## 2018-06-07 DIAGNOSIS — H15.9: Primary | ICD-10-CM

## 2018-06-07 DIAGNOSIS — N13.2: Primary | ICD-10-CM

## 2018-06-07 DIAGNOSIS — D68.9: ICD-10-CM

## 2018-06-07 PROCEDURE — 50590 FRAGMENTING OF KIDNEY STONE: CPT

## 2018-06-07 NOTE — EDM.PDOC
ED HPI GENERAL MEDICAL PROBLEM





- General


Chief Complaint: Eye Problems


Stated Complaint: LEFT EYE PAIN AND ITCHY


Time Seen by Provider: 06/07/18 21:05


Source of Information: Reports: Patient


History Limitations: Reports: No Limitations





- History of Present Illness


INITIAL COMMENTS - FREE TEXT/NARRATIVE: 


HISTORY AND PHYSICAL:





History of present illness:


[Comes to the emergency room for evaluation of a brown spot that she noticed to 

her left sclera this evening. She had lithotripsy to her right kidney earlier 

today completed by Dr. Tolbert. She developed a headache this afternoon. She 

asked her  with into her eyes and he noticed a light brown spot to her 

sclera. She has not had any pain or itching. Has chronic headaches. Today's 

headache is no worse than other headaches.]





Review of systems: 


As per history of present illness and below otherwise all systems reviewed and 

negative.





Past medical history: 


As per history of present illness and as reviewed below otherwise 

noncontributory.





Surgical history: 


As per history of present illness and as reviewed below otherwise 

noncontributory.





Social history: 


No reported history of drug or alcohol abuse.





Family history: 


As per history of present illness and as reviewed below otherwise 

noncontributory.





Physical exam:


HEENT: Atraumatic, normocephalic. L upper lateral sclera shows a 5mm light 

brown flat macular lesion. No erythema or injection. Right eye is clear. 


Lungs: Clear to auscultation, breath sounds equal bilaterally.


Heart: S1S2, regular rate and rhythm. 


Abdomen: Soft, nondistended, nontender. Negative for masses or 

hepatosplenomegaly. Negative for costovertebral tenderness.


Pelvis: Stable nontender.


Genitourinary: Deferred.


Rectal: Deferred.


Extremities: Atraumatic. Neurovascular unremarkable.


Neuro: Awake, alert, oriented. Motor and sensory unremarkable throughout. Exam 

nonfocal.





Impression: 


[brown sclera]





Plan:


[Discussed w/ patient that findings are benign. Continue to monitor. Follow-up 

with PCP or ophthalmology early next week. Strict return precautions reviewed. 

Patient's agreement with today's plan.]





Definitive disposition and diagnosis as appropriate pending reevaluation and 

review of above.








  ** left eye


Pain Score (Numeric/FACES): 8





  ** headache


Pain Score (Numeric/FACES): 8





- Related Data


 Allergies











Allergy/AdvReac Type Severity Reaction Status Date / Time


 


adhesive Allergy  Hives Verified 06/06/18 07:35


 


latex Allergy  Hives Verified 06/06/18 07:35


 


morphine Allergy  Anaphylactic Verified 06/06/18 07:35





   Shock  


 


povidone-iodine Allergy  Hives Verified 06/06/18 07:35





[From Betadine]     


 


soap [From Betadine] Allergy  Hives Verified 06/06/18 07:35











Home Meds: 


 Home Meds





Losartan [Cozaar] 100 mg PO BEDTIME 04/20/18 [History]


Ferrous Sulfate 325 mg PO TIDMEALS 60 Days #180 tablet 04/21/18 [Rx]


Amoxicillin 500 mg PO BID 06/06/18 [History]











Past Medical History


HEENT History: Reports: Other (See Below)


Other HEENT History: wears glasses, has top and bottom dentures


Cardiovascular History: Reports: Hypertension


Respiratory History: Reports: None


Gastrointestinal History: Reports: GERD


Genitourinary History: Reports: Renal Calculus


OB/GYN History: Reports: Pregnancy


Musculoskeletal History: Reports: None


Neurological History: Reports: Migraines


Psychiatric History: Reports: Anxiety


Endocrine/Metabolic History: Reports: None


Hematologic History: Reports: Anemia, Blood Transfusion(s)


Immunologic History: Reports: None


Oncologic (Cancer) History: Reports: None


Dermatologic History: Reports: None





- Infectious Disease History


Infectious Disease History: Reports: None





- Past Surgical History


Head Surgeries/Procedures: Reports: None


HEENT Surgical History: Reports: None


Cardiovascular Surgical History: Reports: None


Respiratory Surgical History: Reports: None


GI Surgical History: Reports: Bariatric Procedure


Female  Surgical History: Reports: Hysterectomy, Lithotripsy/ESWL, Salpingo-

Oophorectomy, Tubal Ligation


Other Female  Surgeries/Procedures: total hysterectomy with bladder sling May 

3, 2018


Endocrine Surgical History: Reports: None


Neurological Surgical History: Reports: None


Musculoskeletal Surgical History: Reports: None


Dermatological Surgical History: Reports: None





Social & Family History





- Family History


Family Medical History: Noncontributory


Cardiac: Reports: Other (See Below)


Other Cardiac Family History: Heart Disease


Endocrine/Metabolic: Reports: Diabetes, type II


Oncologic: Reports: Other (See Below)


Other Oncologic Family History: Cancer





- Tobacco Use


Smoking Status *Q: Never Smoker





- Caffeine Use


Caffeine Use: Reports: Soda





- Recreational Drug Use


Recreational Drug Use: No





ED ROS GENERAL





- Review of Systems


Review Of Systems: ROS reveals no pertinent complaints other than HPI.





ED EXAM GENERAL W FULL EYE





- Physical Exam


Exam: See Below





Course





- Vital Signs


Last Recorded V/S: 


 Last Vital Signs











Temp  97.8 F   06/07/18 20:52


 


Pulse  89   06/07/18 21:30


 


Resp  18   06/07/18 21:30


 


BP  135/81   06/07/18 21:30


 


Pulse Ox  96   06/07/18 21:30














Departure





- Departure


Time of Disposition: 21:15


Disposition: Home, Self-Care 01


Condition: Good


Clinical Impression: 


 Brown sclera








- Discharge Information


Instructions:  Medical Screening Exam


Referrals: 


PCP,Unknown [Primary Care Provider] - 


Forms:  ED Department Discharge


Additional Instructions: 


The following information is given to patients seen in the emergency department 

who are being discharged to home. This information is to outline your options 

for follow-up care. We provide all patients seen in our emergency department 

with a follow-up referral.





The need for follow-up, as well as the timing and circumstances, are variable 

depending upon the specifics of your emergency department visit.





If you don't have a primary care physician on staff, we will provide you with a 

referral. We always advise you to contact your personal physician following an 

emergency department visit to inform them of the circumstance of the visit and 

for follow-up with them and/or the need for any referrals to a consulting 

specialist.





The emergency department will also refer you to a specialist when appropriate. 

This referral assures that you have the opportunity for follow-up care with a 

specialist. All of these measure are taken in an effort to provide you with 

optimal care, which includes your follow-up.





Under all circumstances we always encourage you to contact your private 

physician who remains a resource for coordinating your care. When calling for 

follow-up care, please make the office aware that this follow-up is from your 

recent emergency room visit. If for any reason you are refused follow-up, 

please contact the [facility]  emergency 

department at [phone] and asked to speak to the emergency department 

charge nurse.








[address] [address]


Phone: [phone]


Fax: [phone]








Follow-up with the ophthalmologist or primary care provider in the next 48-72 

hours.


Continue to monitor for any changes or resolution.


Return to ER as needed and as discussed.

## 2018-06-07 NOTE — OR
SURGEON:

Dennise Booth M.D.

 

DATE OF PROCEDURE:  06/07/2018

 

PREOPERATIVE DIAGNOSIS:

Right lower pole calyceal stone, 6 mm.

 

POSTOPERATIVE DIAGNOSIS:

Right lower pole calyceal stone, 6 mm.

 

OPERATION:

ESWL.

 

DESCRIPTION OF PROCEDURE:

The patient was given general anesthesia.  She is on lithotripsy table.  The

position of the patient was adjusted, so the stone could be treated, and

eventually received a total of 2000 shocks.  At the end of the treatment, the

shadow of the stone seemed to disappear.  The stone, however, was hard to see

from __________ stents, but she also has a fair amount of __________that

interfered with the image.  However, I was not possible to visualize the stone

at the end of treatment.  No longer identified.  With that done, the procedure

was terminated and the patient was moved to recovery room in good condition.

 

 

MEGGAN / TERRY

DD:  06/07/2018 10:10:05

DT:  06/07/2018 12:56:13

Job #:  [id number]

## 2018-06-07 NOTE — PCM.PREANE
Preanesthetic Assessment





- Anesthesia/Transfusion/Family Hx


Anesthesia History: Prior Anesthesia Without Reaction


Family History of Anesthesia Reaction: No


Transfusion History: Prior Transfusion Without Reaction





- Review of Systems


General: No Symptoms


Pulmonary: No Symptoms


Cardiovascular: No Symptoms


Gastrointestinal: No Symptoms


Neurological: No Symptoms


Other: Reports: None





- Physical Assessment


NPO Status Date: 06/06/18


Height: 1.65 m


Weight: 76.204 kg


ASA Class: 2


Mental Status: Alert & Oriented x3


Airway Class: Mallampati = 1


Dentition: Reports: Dentures


ROM/Head Extension: Full


Lungs: Clear to Auscultation, Normal Respiratory Effort


Cardiovascular: Regular Rate, Regular Rhythm





- Allergies


Allergies/Adverse Reactions: 


 Allergies











Allergy/AdvReac Type Severity Reaction Status Date / Time


 


adhesive Allergy  Hives Verified 06/06/18 07:35


 


latex Allergy  Hives Verified 06/06/18 07:35


 


morphine Allergy  Anaphylactic Verified 06/06/18 07:35





   Shock  


 


povidone-iodine Allergy  Hives Verified 06/06/18 07:35





[From Betadine]     


 


soap [From Betadine] Allergy  Hives Verified 06/06/18 07:35














- Anesthesia Plan


Pre-Op Medication Ordered: None





- Acknowledgements


Anesthesia Type Planned: General Anesthesia


Pt an Appropriate Candidate for the Planned Anesthesia: Yes


Alternatives and Risks of Anesthesia Discussed w Pt/Guardian: Yes


Pt/Guardian Understands and Agrees with Anesthesia Plan: Yes


Additional Comments: 





PMH:  gerd, HTN, migraine, TORITO, is on Tramadol


Pt denies hypercoaguable disorder, and denies easy bleeding, no vonwillebrands. 

Had vag bleeding prior to hysterectomy but no other evidance of bleeding 

disorder.





PLAN: stone is renal calyceal , so GA-ETT 





PreAnesthesia Questionnaire


HEENT History: Reports: Other (See Below)


Other HEENT History: wears glasses, has top and bottom dentures


Cardiovascular History: Reports: Hypertension


Respiratory History: Reports: None


Gastrointestinal History: Reports: GERD


Genitourinary History: Reports: Renal Calculus


OB/GYN History: Reports: Pregnancy


Musculoskeletal History: Reports: None


Neurological History: Reports: Migraines


Psychiatric History: Reports: Anxiety


Endocrine/Metabolic History: Reports: None


Hematologic History: Reports: Anemia, Blood Transfusion(s)


Immunologic History: Reports: None


Oncologic (Cancer) History: Reports: None


Dermatologic History: Reports: None





- Infectious Disease History


Infectious Disease History: Reports: None





- Past Surgical History


Head Surgeries/Procedures: Reports: None


HEENT Surgical History: Reports: None


Cardiovascular Surgical History: Reports: None


Respiratory Surgical History: Reports: None


GI Surgical History: Reports: Bariatric Procedure


Female  Surgical History: Reports: Hysterectomy, Lithotripsy/ESWL, Salpingo-

Oophorectomy, Tubal Ligation


Other Female  Surgeries/Procedures: total hysterectomy with bladder sling May 

3, 2018


Endocrine Surgical History: Reports: None


Neurological Surgical History: Reports: None


Musculoskeletal Surgical History: Reports: None


Dermatological Surgical History: Reports: None





- SUBSTANCE USE


Smoking Status *Q: Never Smoker


Recreational Drug Use History: No





- HOME MEDS


Home Medications: 


 Home Meds





Losartan [Cozaar] 100 mg PO BEDTIME 04/20/18 [History]


Acetaminophen [Tylenol] 650 mg PO Q4H PRN  tablet 04/21/18 [Rx]


Ferrous Sulfate 325 mg PO TIDMEALS 60 Days #180 tablet 04/21/18 [Rx]


Pantoprazole [ProTONIX***] 40 mg PO BIDAC 30 Days #60 tab.cr 04/21/18 [Rx]


Amoxicillin 500 mg PO BID 06/06/18 [History]


Multivit-Min/FA/Lycopene/Lut [Centrum Silver Tablet] 1 tab PO DAILY 06/06/18 [

History]


Ondansetron [Zofran ODT] 1 tab SL ASDIRECTED PRN 06/06/18 [History]


Rizatriptan Benzoate [Rizatriptan] 1 tab PO ASDIRECTED PRN 06/06/18 [History]











- CURRENT (IN HOUSE) MEDS


Current Meds: 





 Current Medications





Lactated Ringer's (Ringers, Lactated)  1,000 mls @ 100 mls/hr IV ASDIRECTED THOR


   Last Admin: 06/07/18 08:11 Dose:  100 mls/hr


Sodium Chloride (Saline Flush)  10 ml FLUSH ASDIRECTED PRN


   PRN Reason: Keep Vein Open


Sodium Chloride (Saline Flush)  2.5 ml FLUSH ASDIRECTED PRN


   PRN Reason: Keep Vein Open





Discontinued Medications





Cefazolin Sodium (Ancef)  1 gm IV ONCALL ONE


   Stop: 06/07/18 00:02


Fentanyl (Sublimaze) Confirm Administered Dose 250 mcg .ROUTE .STK-MED ONE


   Stop: 06/07/18 08:15


Cefazolin Sodium/Dextrose (Ancef) Confirm Administered Dose 50 mls @ as 

directed .ROUTE .STK-MED ONE


   Stop: 06/07/18 08:16


Lidocaine (Xylocaine-Mpf 2%) Confirm Administered Dose 10 ml .ROUTE .STK-MED ONE


   Stop: 06/07/18 08:14


Midazolam HCl (Versed 1 Mg/Ml) Confirm Administered Dose 2 mg .ROUTE .STK-MED 

ONE


   Stop: 06/07/18 08:15


Propofol (Diprivan  20 Ml) Confirm Administered Dose 400 mg .ROUTE .STK-MED ONE


   Stop: 06/07/18 08:15

## 2019-01-25 ENCOUNTER — HOSPITAL ENCOUNTER (EMERGENCY)
Dept: HOSPITAL 56 - MW.ED | Age: 44
Discharge: HOME | End: 2019-01-25
Payer: COMMERCIAL

## 2019-01-25 DIAGNOSIS — Z88.8: ICD-10-CM

## 2019-01-25 DIAGNOSIS — Z91.040: ICD-10-CM

## 2019-01-25 DIAGNOSIS — I10: ICD-10-CM

## 2019-01-25 DIAGNOSIS — Z13.9: Primary | ICD-10-CM

## 2019-01-25 DIAGNOSIS — Z88.5: ICD-10-CM

## 2019-01-25 DIAGNOSIS — Z79.899: ICD-10-CM

## 2019-01-25 DIAGNOSIS — Z88.0: ICD-10-CM

## 2019-01-25 LAB
CHLORIDE SERPL-SCNC: 105 MMOL/L (ref 98–107)
SODIUM SERPL-SCNC: 138 MMOL/L (ref 136–145)

## 2019-01-25 NOTE — EDM.PDOC
ED HPI GENERAL MEDICAL PROBLEM





- General


Chief Complaint: General


Stated Complaint: abdominal pain


Time Seen by Provider: 01/25/19 20:08





- History of Present Illness


INITIAL COMMENTS - FREE TEXT/NARRATIVE: 


HISTORY AND PHYSICAL:





History of present illness:


Patient's a 43-year-old white female who is 2 weeks into the [employer] and 

has been doing extensive physical training and now presents with a concern of 

abdominal soreness request for medical screening exam. Patient is one-year 

status post hysterectomy for dysfunctional uterine bleeding she denies fever 

chills nausea vomiting or any direct trauma. Urinary tract signs or symptoms





Review of systems: 


As per history of present illness and below otherwise all systems reviewed and 

negative.





Past medical history: 


As per history of present illness and as reviewed below otherwise 

noncontributory.





Surgical history: 


As per history of present illness and as reviewed below otherwise 

noncontributory.





Social history: 


No reported history of drug or alcohol abuse.





Family history: 


As per history of present illness and as reviewed below otherwise 

noncontributory.





Physical exam:


HEENT: Atraumatic, normocephalic, pupils reactive, negative for conjunctival 

pallor or scleral icterus, mucous membranes moist, throat clear, neck supple, 

nontender, trachea midline.


Lungs: Clear to auscultation, breath sounds equal bilaterally, chest nontender.


Heart: S1S2, regular, negative for clicks, rubs, or JVD.


Abdomen: Soft, nondistended, no localized tenderness no evidence of hernia no 

other significant finding. Negative for masses or hepatosplenomegaly. Negative 

for costovertebral tenderness.


Pelvis: Stable nontender.


Genitourinary: Deferred.


Rectal: Deferred.


Extremities: Atraumatic, negative for cords or calf pain. Neurovascular 

unremarkable.


Neuro: Awake, alert, oriented. Cranial nerves II through XII unremarkable. 

Cerebellum unremarkable. Motor and sensory unremarkable throughout. Exam 

nonfocal.





Diagnostics:


CBC CMP and lipase UA





Therapeutics:


None





Impression: 


#1 medical screening exam





Definitive disposition and diagnosis as appropriate pending reevaluation and 

review of above.








- Related Data


 Allergies











Allergy/AdvReac Type Severity Reaction Status Date / Time


 


adhesive Allergy  Hives Verified 06/06/18 07:35


 


latex Allergy  Hives Verified 06/06/18 07:35


 


morphine Allergy  Anaphylactic Verified 06/06/18 07:35





   Shock  


 


Penicillins Allergy  Other Verified 01/25/19 20:19


 


povidone-iodine Allergy  Hives Verified 06/06/18 07:35





[From Betadine]     


 


soap [From Betadine] Allergy  Hives Verified 06/06/18 07:35











Home Meds: 


 Home Meds





Losartan [Cozaar] 100 mg PO BEDTIME 04/20/18 [History]


Ferrous Sulfate 325 mg PO TIDMEALS 60 Days #180 tablet 04/21/18 [Rx]











Past Medical History


HEENT History: Reports: Other (See Below)


Other HEENT History: wears glasses, has top and bottom dentures


Cardiovascular History: Reports: Hypertension


Respiratory History: Reports: None


Gastrointestinal History: Reports: GERD


Genitourinary History: Reports: Renal Calculus


OB/GYN History: Reports: Pregnancy


Musculoskeletal History: Reports: None


Neurological History: Reports: Migraines


Psychiatric History: Reports: Anxiety


Endocrine/Metabolic History: Reports: None


Hematologic History: Reports: Anemia, Blood Transfusion(s)


Immunologic History: Reports: None


Oncologic (Cancer) History: Reports: None


Dermatologic History: Reports: None





- Infectious Disease History


Infectious Disease History: Reports: None





- Past Surgical History


Head Surgeries/Procedures: Reports: None


HEENT Surgical History: Reports: None


Cardiovascular Surgical History: Reports: None


Respiratory Surgical History: Reports: None


GI Surgical History: Reports: Bariatric Procedure


Female  Surgical History: Reports: Hysterectomy, Lithotripsy/ESWL, Salpingo-

Oophorectomy, Tubal Ligation


Other Female  Surgeries/Procedures: total hysterectomy with bladder sling May 

3, 2018


Endocrine Surgical History: Reports: None


Neurological Surgical History: Reports: None


Musculoskeletal Surgical History: Reports: None


Dermatological Surgical History: Reports: None





Social & Family History





- Family History


Family Medical History: Noncontributory


Cardiac: Reports: Other (See Below)


Other Cardiac Family History: Heart Disease


Endocrine/Metabolic: Reports: Diabetes, type II


Oncologic: Reports: Other (See Below)


Other Oncologic Family History: Cancer





- Tobacco Use


Smoking Status *Q: Never Smoker





- Caffeine Use


Caffeine Use: Reports: Soda





- Recreational Drug Use


Recreational Drug Use: No





ED ROS GENERAL





- Review of Systems


Review Of Systems: ROS reveals no pertinent complaints other than HPI.





ED EXAM, GENERAL





- Physical Exam


Exam: See Below (See dictation)





Course





- Vital Signs


Last Recorded V/S: 





 Last Vital Signs











Temp  36.9 C   01/25/19 20:07


 


Pulse  113 H  01/25/19 20:07


 


Resp  18   01/25/19 20:07


 


BP  187/103 H  01/25/19 20:07


 


Pulse Ox  97   01/25/19 20:07














- Orders/Labs/Meds


Orders: 





 Active Orders 24 hr











 Category Date Time Status


 


 CBC WITH AUTO DIFF [HEME] Stat Lab  01/25/19 20:23 Ordered


 


 COMPREHENSIVE METABOLIC PN,CMP [CHEM] Stat Lab  01/25/19 20:23 Ordered


 


 INR,PT,PROTHROMBIN TIME [COAG] Stat Lab  01/25/19 20:23 Ordered


 


 LIPASE [CHEM] Stat Lab  01/25/19 20:23 Ordered


 


 UA RFX VANESSA AND CULT IF INDIC [URIN] Stat Lab  01/25/19 20:10 Received














Departure





- Departure


Time of Disposition: 20:34


Disposition: Home, Self-Care 01


Condition: Good


Clinical Impression: 


 Encounter for medical screening examination








- Discharge Information


Referrals: 


PCP,Unknown [Primary Care Provider] - 


Additional Instructions: 


The following information is given to patients seen in the emergency department 

who are being discharged to home. This information is to outline your options 

for follow-up care. We provide all patients seen in our emergency department 

with a follow-up referral.





The need for follow-up, as well as the timing and circumstances, are variable 

depending upon the specifics of your emergency department visit.





If you don't have a primary care physician on staff, we will provide you with a 

referral. We always advise you to contact your personal physician following an 

emergency department visit to inform them of the circumstance of the visit and 

for follow-up with them and/or the need for any referrals to a consulting 

specialist.





The emergency department will also refer you to a specialist when appropriate. 

This referral assures that you have the opportunity for followup care with a 

specialist. All of these measure are taken in an effort to provide you with 

optimal care, which includes your followup.





Under all circumstances we always encourage you to contact your private 

physician who remains a resource for coordinating  your care. When calling for 

followup care, please make the office aware that this follow-up is from your 

recent emergency room visit. If for any reason you are refused follow-up, 

please contact the [facility] emergency department at [phone] 

and asked to speak to the emergency department charge nurse.

















Motrin/Tylenol as directed follow-up primary medical doctor as needed as 

discussed and return as needed as discussed





- My Orders


Last 24 Hours: 





My Active Orders





01/25/19 20:10


UA RFX VANESSA AND CULT IF INDIC [URIN] Stat 





01/25/19 20:23


CBC WITH AUTO DIFF [HEME] Stat 


COMPREHENSIVE METABOLIC PN,CMP [CHEM] Stat 


INR,PT,PROTHROMBIN TIME [COAG] Stat 


LIPASE [CHEM] Stat 














- Assessment/Plan


Last 24 Hours: 





My Active Orders





01/25/19 20:10


UA RFX VANESSA AND CULT IF INDIC [URIN] Stat 





01/25/19 20:23


CBC WITH AUTO DIFF [HEME] Stat 


COMPREHENSIVE METABOLIC PN,CMP [CHEM] Stat 


INR,PT,PROTHROMBIN TIME [COAG] Stat 


LIPASE [CHEM] Stat